# Patient Record
Sex: FEMALE | Race: BLACK OR AFRICAN AMERICAN | Employment: FULL TIME | URBAN - METROPOLITAN AREA
[De-identification: names, ages, dates, MRNs, and addresses within clinical notes are randomized per-mention and may not be internally consistent; named-entity substitution may affect disease eponyms.]

---

## 2017-02-20 ENCOUNTER — HOSPITAL ENCOUNTER (OUTPATIENT)
Dept: LAB | Age: 62
Discharge: HOME | End: 2017-02-20
Payer: COMMERCIAL

## 2017-02-20 LAB
ANION GAP BLD CALC-SCNC: 7 MMOL/L (ref 3–18)
BUN SERPL-MCNC: 12 MG/DL (ref 7–18)
BUN/CREAT SERPL: 12 (ref 12–20)
CALCIUM SERPL-MCNC: 9.5 MG/DL (ref 8.5–10.1)
CHLORIDE SERPL-SCNC: 105 MMOL/L (ref 100–108)
CHOLEST SERPL-MCNC: 158 MG/DL
CO2 SERPL-SCNC: 30 MMOL/L (ref 21–32)
CREAT SERPL-MCNC: 1.03 MG/DL (ref 0.6–1.3)
GLUCOSE SERPL-MCNC: 107 MG/DL (ref 74–99)
HCV AB SER IA-ACNC: 0.08 INDEX
HCV AB SERPL QL IA: NEGATIVE
HCV COMMENT,HCGAC: NORMAL
HDLC SERPL-MCNC: 41 MG/DL (ref 40–60)
HDLC SERPL: 3.9 {RATIO} (ref 0–5)
LDLC SERPL CALC-MCNC: 101.6 MG/DL (ref 0–100)
LIPID PROFILE,FLP: ABNORMAL
POTASSIUM SERPL-SCNC: 3.8 MMOL/L (ref 3.5–5.5)
SODIUM SERPL-SCNC: 142 MMOL/L (ref 136–145)
TRIGL SERPL-MCNC: 77 MG/DL (ref ?–150)
VLDLC SERPL CALC-MCNC: 15.4 MG/DL

## 2017-02-20 PROCEDURE — 86803 HEPATITIS C AB TEST: CPT | Performed by: FAMILY MEDICINE

## 2017-02-20 PROCEDURE — 80061 LIPID PANEL: CPT | Performed by: FAMILY MEDICINE

## 2017-02-20 PROCEDURE — 80048 BASIC METABOLIC PNL TOTAL CA: CPT | Performed by: FAMILY MEDICINE

## 2017-02-20 PROCEDURE — 36415 COLL VENOUS BLD VENIPUNCTURE: CPT | Performed by: FAMILY MEDICINE

## 2017-04-12 ENCOUNTER — OFFICE VISIT (OUTPATIENT)
Dept: FAMILY MEDICINE CLINIC | Age: 62
End: 2017-04-12

## 2017-04-12 VITALS
WEIGHT: 161 LBS | TEMPERATURE: 97.9 F | HEIGHT: 69 IN | HEART RATE: 73 BPM | BODY MASS INDEX: 23.85 KG/M2 | RESPIRATION RATE: 16 BRPM | OXYGEN SATURATION: 98 % | SYSTOLIC BLOOD PRESSURE: 140 MMHG | DIASTOLIC BLOOD PRESSURE: 76 MMHG

## 2017-04-12 DIAGNOSIS — I10 ESSENTIAL HYPERTENSION: Primary | ICD-10-CM

## 2017-04-12 DIAGNOSIS — R73.9 ELEVATED BLOOD SUGAR: ICD-10-CM

## 2017-04-12 DIAGNOSIS — E78.00 HYPERCHOLESTEROLEMIA: ICD-10-CM

## 2017-04-12 RX ORDER — METOPROLOL SUCCINATE 50 MG/1
TABLET, EXTENDED RELEASE ORAL
Qty: 30 TAB | Refills: 6 | Status: SHIPPED | OUTPATIENT
Start: 2017-04-12 | End: 2017-11-06 | Stop reason: SDUPTHER

## 2017-04-12 RX ORDER — HYDROCHLOROTHIAZIDE 25 MG/1
25 TABLET ORAL DAILY
Qty: 30 TAB | Refills: 6 | Status: SHIPPED | OUTPATIENT
Start: 2017-04-12 | End: 2017-11-30 | Stop reason: SDUPTHER

## 2017-04-12 NOTE — PATIENT INSTRUCTIONS

## 2017-04-12 NOTE — MR AVS SNAPSHOT
Visit Information Date & Time Provider Department Dept. Phone Encounter #  
 4/12/2017  3:40 PM Richa Schwab, [address] [id number] Follow-up Instructions Return in about 6 months (around 10/12/2017) for well exam. Upcoming Health Maintenance Date Due DTaP/Tdap/Td series (1 - Tdap) 11/7/1976 BREAST CANCER SCRN MAMMOGRAM 11/7/2005 ZOSTER VACCINE AGE 60> 11/7/2015 INFLUENZA AGE 9 TO ADULT 8/1/2016 PAP AKA CERVICAL CYTOLOGY 2/3/2019 COLONOSCOPY 2/3/2026 Allergies as of 4/12/2017  Review Complete On: 4/12/2017 By: Richa Schwab MD  
  
 Severity Noted Reaction Type Reactions Codeine High 08/16/2010    Nausea and Vomiting Violent vomiting Ace Inhibitors  08/16/2010   Side Effect Cough Clonidine  05/29/2012    Other (comments) Visual disturbance Hyzaar [Losartan-hydrochlorothiazide]  12/08/2011    Other (comments) Palpitations. Able to tolerate HCTZ Current Immunizations  Never Reviewed No immunizations on file. Not reviewed this visit You Were Diagnosed With   
  
 Codes Comments Essential hypertension    -  Primary ICD-10-CM: I10 
ICD-9-CM: 401.9 Hypercholesterolemia     ICD-10-CM: E78.00 ICD-9-CM: 272.0 Elevated blood sugar     ICD-10-CM: R73.9 ICD-9-CM: 790.29 Vitals BP Pulse Temp Resp Height(growth percentile) Weight(growth percentile) 140/76 73 97.9 °F (36.6 °C) (Oral) 16 5' 9\" (1.753 m) 161 lb (73 kg) SpO2 BMI OB Status Smoking Status 98% 23.78 kg/m2 Postmenopausal Never Smoker Vitals History BMI and BSA Data Body Mass Index Body Surface Area 23.78 kg/m 2 1.89 m 2 Preferred Pharmacy [facility] Name [facility] [address], [address] [phone] Your Updated Medication List  
  
   
This list is accurate as of: 4/12/17  4:10 PM.  Always use your most recent med list.  
  
  
  
  
 hydroCHLOROthiazide 25 mg tablet Commonly known as:  HYDRODIURIL Take 1 Tab by mouth daily. JINTELI 1-5 mg-mcg Tab Generic drug:  norethindrone acetate-ethinyl estradiol Take  by mouth. METAMUCIL (SUGAR) Powd Generic drug:  psyllium seed-sucrose Take  by mouth.  
  
 metoprolol succinate 50 mg XL tablet Commonly known as:  TOPROL-XL  
take 1 tablet by mouth once daily MULTI-VITAMIN PO Take 1 Tab by mouth daily. Prescriptions Sent to Pharmacy Refills  
 metoprolol succinate (TOPROL-XL) 50 mg XL tablet 6 Sig: take 1 tablet by mouth once daily Class: Normal  
 Pharmacy: [facility] [address] [place] Ph #: [phone]  
 hydroCHLOROthiazide (HYDRODIURIL) 25 mg tablet 6 Sig: Take 1 Tab by mouth daily. Class: Normal  
 [facility] [address] [place] [address], [facility] Ph #: [phone] Route: Oral  
  
Follow-up Instructions Return in about 6 months (around 10/12/2017) for well exam. To-Do List   
 08/16/2017 Lab:  HEMOGLOBIN A1C WITH EAG   
  
 08/16/2017 Lab:  LIPID PANEL   
  
 08/16/2017 Lab:  METABOLIC PANEL, BASIC Patient Instructions Low Sodium Diet (2,000 Milligram): Care Instructions Your Care Instructions Too much sodium causes your body to hold on to extra water. This can raise your blood pressure and force your heart and kidneys to work harder. In very serious cases, this could cause you to be put in the hospital. It might even be life-threatening. By limiting sodium, you will feel better and lower your risk of serious problems. The most common source of sodium is salt. People get most of the salt in their diet from canned, prepared, and packaged foods. Fast food and restaurant meals also are very high in sodium.  Your doctor will probably limit your sodium to less than 2,000 milligrams (mg) a day. This limit counts all the sodium in prepared and packaged foods and any salt you add to your food. Follow-up care is a key part of your treatment and safety. Be sure to make and go to all appointments, and call your doctor if you are having problems. It's also a good idea to know your test results and keep a list of the medicines you take. How can you care for yourself at home? Read food labels · Read labels on cans and food packages. The labels tell you how much sodium is in each serving. Make sure that you look at the serving size. If you eat more than the serving size, you have eaten more sodium. · Food labels also tell you the Percent Daily Value for sodium. Choose products with low Percent Daily Values for sodium. · Be aware that sodium can come in forms other than salt, including monosodium glutamate (MSG), sodium citrate, and sodium bicarbonate (baking soda). MSG is often added to Asian food. When you eat out, you can sometimes ask for food without MSG or added salt. Buy low-sodium foods · Buy foods that are labeled \"unsalted\" (no salt added), \"sodium-free\" (less than 5 mg of sodium per serving), or \"low-sodium\" (less than 140 mg of sodium per serving). Foods labeled \"reduced-sodium\" and \"light sodium\" may still have too much sodium. Be sure to read the label to see how much sodium you are getting. · Buy fresh vegetables, or frozen vegetables without added sauces. Buy low-sodium versions of canned vegetables, soups, and other canned goods. Prepare low-sodium meals · Cut back on the amount of salt you use in cooking. This will help you adjust to the taste. Do not add salt after cooking. One teaspoon of salt has about 2,300 mg of sodium. · Take the salt shaker off the table. · Flavor your food with garlic, lemon juice, onion, vinegar, herbs, and spices.  Do not use soy sauce, lite soy sauce, steak sauce, onion salt, garlic salt, celery salt, mustard, or ketchup on your food. · Use low-sodium salad dressings, sauces, and ketchup. Or make your own salad dressings and sauces without adding salt. · Use less salt (or none) when recipes call for it. You can often use half the salt a recipe calls for without losing flavor. Other foods such as rice, pasta, and grains do not need added salt. · Rinse canned vegetables, and cook them in fresh water. This removes somebut not allof the salt. · Avoid water that is naturally high in sodium or that has been treated with water softeners, which add sodium. Call your local water company to find out the sodium content of your water supply. If you buy bottled water, read the label and choose a sodium-free brand. Avoid high-sodium foods · Avoid eating: ¨ Smoked, cured, salted, and canned meat, fish, and poultry. ¨ Ham, rodriguez, hot dogs, and luncheon meats. ¨ Regular, hard, and processed cheese and regular peanut butter. ¨ Crackers with salted tops, and other salted snack foods such as pretzels, chips, and salted popcorn. ¨ Frozen prepared meals, unless labeled low-sodium. ¨ Canned and dried soups, broths, and bouillon, unless labeled sodium-free or low-sodium. ¨ Canned vegetables, unless labeled sodium-free or low-sodium. ¨ Western Cara fries, pizza, tacos, and other fast foods. ¨ Pickles, olives, ketchup, and other condiments, especially soy sauce, unless labeled sodium-free or low-sodium. Where can you learn more? Go to http://vidya-arcadio.info/. Enter T952 in the search box to learn more about \"Low Sodium Diet (2,000 Milligram): Care Instructions. \" Current as of: July 26, 2016 Content Version: 11.2 © 3400-4538 [employer]. Care instructions adapted under license by [employer] (which disclaims liability or warranty for this information).  If you have questions about a medical condition or this instruction, always ask your healthcare professional. [address]  any warranty or liability for your use of this information. Introducing [facility] [place] [facility]! Dear Tea Goyal: Thank you for requesting a [employer] account. Our records indicate that you already have an active [employer]ine[employer] account. You can access your account anytime at [email or web address] Did you know that you can access your hospital and ER discharge instructions at any time in Coiney? You can also review all of your test results from your hospital stay or ER visit. Additional Information If you have questions, please visit the Frequently Asked Questions section of the [employer] website at [email or web address] Remember, Coiney is NOT to be used for urgent needs. For medical emergencies, dial 911. Now available from your iPhone and Android! Please provide this summary of care documentation to your next provider. Your primary care clinician is listed as [address]. If you have any questions after today's visit, please call [phone].

## 2017-04-12 NOTE — PROGRESS NOTES
1. Have you been to the ER, urgent care clinic since your last visit? Hospitalized since your last visit? No    2. Have you seen or consulted any other health care providers outside of the [address] [place] [address] since your last visit? Include any pap smears or colon screening.  No

## 2017-04-12 NOTE — PROGRESS NOTES
HISTORY OF PRESENT ILLNESS  Tiffany Ramirez is a 64 y.o. female. HPI  Patient is here today for evaluation and treatment of: Hypertension/Cholesterol problem    Hypertension: initial BP here is elevated. Repeat is better. She at times may have elevated readings at home but most of the BPs are stable. Cholesterol: she is not on meds for chol ; she attempts a lower cholesterol diet ; She stays active. Blood sugar was noted to be slightly elevated at last blood check; Will check an A1c at the next check. Current Outpatient Prescriptions:     metoprolol succinate (TOPROL-XL) 50 mg XL tablet, take 1 tablet by mouth once daily, Disp: 30 Tab, Rfl: 6    hydroCHLOROthiazide (HYDRODIURIL) 25 mg tablet, Take 1 Tab by mouth daily. , Disp: 30 Tab, Rfl: 6    ethinyl estradiol-norethindron (JINTELI) 1-5 mg-mcg Tab, Take  by mouth., Disp: , Rfl:     psyllium seed-sucrose (METAMUCIL) Powd, Take  by mouth., Disp: , Rfl:     MULTI-VITAMIN PO, Take 1 Tab by mouth daily. , Disp: , Rfl:       PMH,  Meds, Allergies, Family History, Social history reviewed    Review of Systems   Respiratory: Negative for sputum production. Cardiovascular: Negative for chest pain. Physical Exam   Constitutional: She appears well-developed and well-nourished. No distress. Neck: Neck supple. No thyromegaly present. No carotid bruits   Cardiovascular: Normal rate and regular rhythm. Exam reveals no gallop and no friction rub. No murmur heard. Pulmonary/Chest: Breath sounds normal. No respiratory distress. She has no wheezes. She has no rales. Musculoskeletal: She exhibits no edema. Nursing note and vitals reviewed.      Visit Vitals    /76    Pulse 73    Temp 97.9 °F (36.6 °C) (Oral)    Resp 16    Ht 5' 9\" (1.753 m)    Wt 161 lb (73 kg)    SpO2 98%    BMI 23.78 kg/m2           Lab Results   Component Value Date/Time    Cholesterol, total 158 02/20/2017 08:25 AM    HDL Cholesterol 41 02/20/2017 08:25 AM    LDL, calculated 101.6 02/20/2017 08:25 AM    VLDL, calculated 15.4 02/20/2017 08:25 AM    Triglyceride 77 02/20/2017 08:25 AM    CHOL/HDL Ratio 3.9 02/20/2017 08:25 AM     Lab Results   Component Value Date/Time    Sodium 142 02/20/2017 08:25 AM    Potassium 3.8 02/20/2017 08:25 AM    Chloride 105 02/20/2017 08:25 AM    CO2 30 02/20/2017 08:25 AM    Anion gap 7 02/20/2017 08:25 AM    Glucose 107 02/20/2017 08:25 AM    BUN 12 02/20/2017 08:25 AM    Creatinine 1.03 02/20/2017 08:25 AM    BUN/Creatinine ratio 12 02/20/2017 08:25 AM    GFR est AA >60 02/20/2017 08:25 AM    GFR est non-AA 54 02/20/2017 08:25 AM    Calcium 9.5 02/20/2017 08:25 AM       ASSESSMENT and PLAN    ICD-10-CM ICD-9-CM    1. Essential hypertension F35 314.8 METABOLIC PANEL, BASIC   2. Hypercholesterolemia E78.00 272.0 LIPID PANEL   3. Elevated blood sugar R73.9 790.29 HEMOGLOBIN A1C WITH EAG       As above,   above all stable unless otherwise noted  Labs as ordered  Continue current meds as ordered  Lower salt, lower carb diet advised  Follow-up Disposition:  Return in about 6 months (around 10/12/2017) for well exam.  An After Visit Summary was printed and given to the patient. This has been fully explained to the patient, who indicates understanding.

## 2017-08-16 DIAGNOSIS — I10 ESSENTIAL HYPERTENSION: ICD-10-CM

## 2017-08-16 DIAGNOSIS — E78.00 HYPERCHOLESTEROLEMIA: ICD-10-CM

## 2017-08-16 DIAGNOSIS — R73.9 ELEVATED BLOOD SUGAR: ICD-10-CM

## 2017-11-07 ENCOUNTER — HOSPITAL ENCOUNTER (OUTPATIENT)
Dept: LAB | Age: 62
Discharge: HOME | End: 2017-11-07
Payer: COMMERCIAL

## 2017-11-07 LAB
ANION GAP SERPL CALC-SCNC: 6 MMOL/L (ref 3–18)
BUN SERPL-MCNC: 14 MG/DL (ref 7–18)
BUN/CREAT SERPL: 15 (ref 12–20)
CALCIUM SERPL-MCNC: 9.2 MG/DL (ref 8.5–10.1)
CHLORIDE SERPL-SCNC: 104 MMOL/L (ref 100–108)
CHOLEST SERPL-MCNC: 148 MG/DL
CO2 SERPL-SCNC: 30 MMOL/L (ref 21–32)
CREAT SERPL-MCNC: 0.96 MG/DL (ref 0.6–1.3)
EST. AVERAGE GLUCOSE BLD GHB EST-MCNC: 126 MG/DL
GLUCOSE SERPL-MCNC: 99 MG/DL (ref 74–99)
HBA1C MFR BLD: 6 % (ref 4.2–5.6)
HDLC SERPL-MCNC: 45 MG/DL (ref 40–60)
HDLC SERPL: 3.3 {RATIO} (ref 0–5)
LDLC SERPL CALC-MCNC: 90.4 MG/DL (ref 0–100)
LIPID PROFILE,FLP: NORMAL
POTASSIUM SERPL-SCNC: 3.7 MMOL/L (ref 3.5–5.5)
SODIUM SERPL-SCNC: 140 MMOL/L (ref 136–145)
TRIGL SERPL-MCNC: 63 MG/DL (ref ?–150)
VLDLC SERPL CALC-MCNC: 12.6 MG/DL

## 2017-11-07 PROCEDURE — 83036 HEMOGLOBIN GLYCOSYLATED A1C: CPT | Performed by: FAMILY MEDICINE

## 2017-11-07 PROCEDURE — 80048 BASIC METABOLIC PNL TOTAL CA: CPT | Performed by: FAMILY MEDICINE

## 2017-11-07 PROCEDURE — 80061 LIPID PANEL: CPT | Performed by: FAMILY MEDICINE

## 2017-11-07 PROCEDURE — 36415 COLL VENOUS BLD VENIPUNCTURE: CPT | Performed by: FAMILY MEDICINE

## 2017-11-07 RX ORDER — METOPROLOL SUCCINATE 50 MG/1
TABLET, EXTENDED RELEASE ORAL
Qty: 30 TAB | Refills: 6 | Status: SHIPPED | OUTPATIENT
Start: 2017-11-07 | End: 2017-11-15 | Stop reason: SDUPTHER

## 2017-11-15 ENCOUNTER — OFFICE VISIT (OUTPATIENT)
Dept: FAMILY MEDICINE CLINIC | Age: 62
End: 2017-11-15

## 2017-11-15 ENCOUNTER — HOSPITAL ENCOUNTER (OUTPATIENT)
Dept: LAB | Age: 62
Discharge: HOME | End: 2017-11-15
Payer: COMMERCIAL

## 2017-11-15 VITALS
DIASTOLIC BLOOD PRESSURE: 80 MMHG | WEIGHT: 165 LBS | HEART RATE: 76 BPM | BODY MASS INDEX: 24.44 KG/M2 | TEMPERATURE: 99.3 F | OXYGEN SATURATION: 98 % | RESPIRATION RATE: 18 BRPM | SYSTOLIC BLOOD PRESSURE: 150 MMHG | HEIGHT: 69 IN

## 2017-11-15 DIAGNOSIS — D50.8 IRON DEFICIENCY ANEMIA SECONDARY TO INADEQUATE DIETARY IRON INTAKE: ICD-10-CM

## 2017-11-15 DIAGNOSIS — Z00.00 WELL WOMAN EXAM (NO GYNECOLOGICAL EXAM): Primary | ICD-10-CM

## 2017-11-15 LAB
BASOPHILS # BLD: 0 K/UL (ref 0–0.06)
BASOPHILS NFR BLD: 0 % (ref 0–2)
DIFFERENTIAL METHOD BLD: ABNORMAL
EOSINOPHIL # BLD: 0.2 K/UL (ref 0–0.4)
EOSINOPHIL NFR BLD: 2 % (ref 0–5)
ERYTHROCYTE [DISTWIDTH] IN BLOOD BY AUTOMATED COUNT: 13 % (ref 11.6–14.5)
HCT VFR BLD AUTO: 37.8 % (ref 35–45)
HGB BLD-MCNC: 12.7 G/DL (ref 12–16)
LYMPHOCYTES # BLD: 3.1 K/UL (ref 0.9–3.6)
LYMPHOCYTES NFR BLD: 38 % (ref 21–52)
MCH RBC QN AUTO: 31.4 PG (ref 24–34)
MCHC RBC AUTO-ENTMCNC: 33.6 G/DL (ref 31–37)
MCV RBC AUTO: 93.6 FL (ref 74–97)
MONOCYTES # BLD: 0.4 K/UL (ref 0.05–1.2)
MONOCYTES NFR BLD: 5 % (ref 3–10)
NEUTS SEG # BLD: 4.4 K/UL (ref 1.8–8)
NEUTS SEG NFR BLD: 55 % (ref 40–73)
PLATELET # BLD AUTO: 202 K/UL (ref 135–420)
PMV BLD AUTO: 12.6 FL (ref 9.2–11.8)
RBC # BLD AUTO: 4.04 M/UL (ref 4.2–5.3)
WBC # BLD AUTO: 8.1 K/UL (ref 4.6–13.2)

## 2017-11-15 PROCEDURE — 36415 COLL VENOUS BLD VENIPUNCTURE: CPT | Performed by: FAMILY MEDICINE

## 2017-11-15 PROCEDURE — 85025 COMPLETE CBC W/AUTO DIFF WBC: CPT | Performed by: FAMILY MEDICINE

## 2017-11-15 RX ORDER — METOPROLOL SUCCINATE 100 MG/1
TABLET, EXTENDED RELEASE ORAL
Qty: 30 TAB | Refills: 6 | Status: SHIPPED | OUTPATIENT
Start: 2017-11-15 | End: 2018-06-04 | Stop reason: SDUPTHER

## 2017-11-15 NOTE — PROGRESS NOTES
1. Have you been to the ER, urgent care clinic since your last visit? Hospitalized since your last visit? No    2. Have you seen or consulted any other health care providers outside of the [address] [place] [address] since your last visit? Include any pap smears or colon screening.  No

## 2017-11-15 NOTE — PROGRESS NOTES
HISTORY OF PRESENT ILLNESS  Glendora Everett Brunner is a 58 y.o. female. HPI   Patient is here today for evaluation and treatment of; Hypertension.       Hypertension:        ROS    Physical Exam    ASSESSMENT and PLAN  Duplicate info

## 2017-11-15 NOTE — MR AVS SNAPSHOT
Visit Information Date & Time Provider Department Dept. Phone Encounter #  
 11/15/2017  3:20 PM Fadumo Leo, [address] [place] [address] [place] [facility] [phone] [id number] Follow-up Instructions Return in about 3 months (around 2/15/2018) for BP/toprol increase/prediabetes. Upcoming Health Maintenance Date Due ZOSTER VACCINE AGE 60> 1/25/2018* PAP AKA CERVICAL CYTOLOGY 2/3/2019 BREAST CANCER SCRN MAMMOGRAM 2/15/2019 COLONOSCOPY 2/3/2026 DTaP/Tdap/Td series (2 - Td) 11/15/2027 *Topic was postponed. The date shown is not the original due date. Allergies as of 11/15/2017  Review Complete On: 11/15/2017 By: Fadumo Leo MD  
  
 Severity Noted Reaction Type Reactions Codeine High 08/16/2010    Nausea and Vomiting Violent vomiting Ace Inhibitors  08/16/2010   Side Effect Cough Clonidine  05/29/2012    Other (comments) Visual disturbance Hyzaar [Losartan-hydrochlorothiazide]  12/08/2011    Other (comments) Palpitations. Able to tolerate HCTZ Current Immunizations  Never Reviewed Name Date Influenza Vaccine 9/1/2017 Not reviewed this visit You Were Diagnosed With   
  
 Codes Comments Well woman exam (no gynecological exam)    -  Primary ICD-10-CM: Z00.00 ICD-9-CM: V70.0 Iron deficiency anemia secondary to inadequate dietary iron intake     ICD-10-CM: D50.8 ICD-9-CM: 280.1 Vitals BP Pulse Temp Resp Height(growth percentile) Weight(growth percentile) 150/80 76 99.3 °F (37.4 °C) (Oral) 18 5' 9\" (1.753 m) 165 lb (74.8 kg) SpO2 BMI OB Status Smoking Status 98% 24.37 kg/m2 Postmenopausal Never Smoker Vitals History BMI and BSA Data Body Mass Index Body Surface Area  
 24.37 kg/m 2 1.91 m 2 Preferred Pharmacy Pharmacy Name Phone [facility] [address] [facility], [address] [facility] [facility] [phone] Your Updated Medication List  
  
   
 This list is accurate as of: 11/15/17  4:26 PM.  Always use your most recent med list.  
  
  
  
  
 FLUARIX QUAD 1129-7433 (PF) Syrg injection Generic drug:  influenza vaccine 2017-18 (3 yrs+)(PF)  
inject 0.5 milliliter intramuscularly  
  
 hydroCHLOROthiazide 25 mg tablet Commonly known as:  HYDRODIURIL Take 1 Tab by mouth daily. JINTELI 1-5 mg-mcg Tab Generic drug:  norethindrone acetate-ethinyl estradiol Take  by mouth. METAMUCIL (SUGAR) Powd Generic drug:  psyllium seed-sucrose Take  by mouth.  
  
 metoprolol succinate 100 mg tablet Commonly known as:  TOPROL-XL  
take 1 tablet by mouth once daily [address] Take  by mouth. MULTI-VITAMIN PO Take 1 Tab by mouth daily. Prescriptions Sent to Pharmacy Refills  
 metoprolol succinate (TOPROL-XL) 100 mg tablet 6 Sig: take 1 tablet by mouth once daily Class: Normal  
 Pharmacy: [facility] [address] [place] [address], [address] [place] [address] #: [phone] Follow-up Instructions Return in about 3 months (around 2/15/2018) for BP/toprol increase/prediabetes. To-Do List   
 11/15/2017 Lab:  CBC WITH AUTOMATED DIFF Patient Instructions Well Visit, Women 48 to 72: Care Instructions Your Care Instructions Physical exams can help you stay healthy. Your doctor has checked your overall health and may have suggested ways to take good care of yourself. He or she also may have recommended tests. At home, you can help prevent illness with healthy eating, regular exercise, and other steps. Follow-up care is a key part of your treatment and safety. Be sure to make and go to all appointments, and call your doctor if you are having problems. It's also a good idea to know your test results and keep a list of the medicines you take. How can you care for yourself at home? · Reach and stay at a healthy weight.  This will lower your risk for many problems, such as obesity, diabetes, heart disease, and high blood pressure. · Get at least 30 minutes of exercise on most days of the week. Walking is a good choice. You also may want to do other activities, such as running, swimming, cycling, or playing tennis or team sports. · Do not smoke. Smoking can make health problems worse. If you need help quitting, talk to your doctor about stop-smoking programs and medicines. These can increase your chances of quitting for good. · Protect your skin from too much sun. When you're outdoors from 10 a.m. to 4 p.m., stay in the shade or cover up with clothing and a hat with a wide brim. Wear sunglasses that block UV rays. Even when it's cloudy, put broad-spectrum sunscreen (SPF 30 or higher) on any exposed skin. · See a dentist one or two times a year for checkups and to have your teeth cleaned. · Wear a seat belt in the car. · Limit alcohol to 1 drink a day. Too much alcohol can cause health problems. Follow your doctor's advice about when to have certain tests. These tests can spot problems early. · Cholesterol. Your doctor will tell you how often to have this done based on your age, family history, or other things that can increase your risk for heart attack and stroke. · Blood pressure. Have your blood pressure checked during a routine doctor visit. Your doctor will tell you how often to check your blood pressure based on your age, your blood pressure results, and other factors. · Mammogram. Ask your doctor how often you should have a mammogram, which is an X-ray of your breasts. A mammogram can spot breast cancer before it can be felt and when it is easiest to treat. · Pap test and pelvic exam. Ask your doctor how often you should have a Pap test. You may not need to have a Pap test as often as you used to. · Vision. Have your eyes checked every year or two or as often as your doctor suggests.  Some experts recommend that you have yearly exams for glaucoma and other age-related eye problems starting at age 48. · Hearing. Tell your doctor if you notice any change in your hearing. You can have tests to find out how well you hear. · Diabetes. Ask your doctor whether you should have tests for diabetes. · Colon cancer. You should begin tests for colon cancer at age 48. You may have one of several tests. Your doctor will tell you how often to have tests based on your age and risk. Risks include whether you already had a precancerous polyp removed from your colon or whether your parents, sisters and brothers, or children have had colon cancer. · Thyroid disease. Talk to your doctor about whether to have your thyroid checked as part of a regular physical exam. Women have an increased chance of a thyroid problem. · Osteoporosis. You should begin tests for bone density at age 72. If you are younger than 72, ask your doctor whether you have factors that may increase your risk for this disease. You may want to have this test before age 72. · Heart attack and stroke risk. At least every 4 to 6 years, you should have your risk for heart attack and stroke assessed. Your doctor uses factors such as your age, blood pressure, cholesterol, and whether you smoke or have diabetes to show what your risk for a heart attack or stroke is over the next 10 years. When should you call for help? Watch closely for changes in your health, and be sure to contact your doctor if you have any problems or symptoms that concern you. Where can you learn more? Go to http://vidya-arcadio.info/. Enter A692 in the search box to learn more about \"Well Visit, Women 50 to 72: Care Instructions. \" Current as of: May 12, 2017 Content Version: 11.4 © 6519-3866 [employer]. Care instructions adapted under license by [employer] (which disclaims liability or warranty for this information).  If you have questions about a medical condition or this instruction, always ask your healthcare professional. [place] [address] any warranty or liability for your use of this information. Learning About Diabetes Food Guidelines Your Care Instructions Meal planning is important to manage diabetes. It helps keep your blood sugar at a target level (which you set with your doctor). You don't have to eat special foods. You can eat what your family eats, including sweets once in a while. But you do have to pay attention to how often you eat and how much you eat of certain foods. You may want to work with a dietitian or a certified diabetes educator (CDE) to help you plan meals and snacks. A dietitian or CDE can also help you lose weight if that is one of your goals. What should you know about eating carbs? Managing the amount of carbohydrate (carbs) you eat is an important part of healthy meals when you have diabetes. Carbohydrate is found in many foods. · Learn which foods have carbs. And learn the amounts of carbs in different foods. ¨ Bread, cereal, pasta, and rice have about 15 grams of carbs in a serving. A serving is 1 slice of bread (1 ounce), ½ cup of cooked cereal, or 1/3 cup of cooked pasta or rice. ¨ Fruits have 15 grams of carbs in a serving. A serving is 1 small fresh fruit, such as an apple or orange; ½ of a banana; ½ cup of cooked or canned fruit; ½ cup of fruit juice; 1 cup of melon or raspberries; or 2 tablespoons of dried fruit. ¨ Milk and no-sugar-added yogurt have 15 grams of carbs in a serving. A serving is 1 cup of milk or 2/3 cup of no-sugar-added yogurt. ¨ Starchy vegetables have 15 grams of carbs in a serving. A serving is ½ cup of mashed potatoes or sweet potato; 1 cup winter squash; ½ of a small baked potato; ½ cup of cooked beans; or ½ cup cooked corn or green peas. · Learn how much carbs to eat each day and at each meal. A dietitian or CDE can teach you how to keep track of the amount of carbs you eat.  This is called carbohydrate counting. · If you are not sure how to count carbohydrate grams, use the Plate Method to plan meals. It is a good, quick way to make sure that you have a balanced meal. It also helps you spread carbs throughout the day. ¨ Divide your plate by types of foods. Put non-starchy vegetables on half the plate, meat or other protein food on one-quarter of the plate, and a grain or starchy vegetable in the final quarter of the plate. To this you can add a small piece of fruit and 1 cup of milk or yogurt, depending on how many carbs you are supposed to eat at a meal. 
· Try to eat about the same amount of carbs at each meal. Do not \"save up\" your daily allowance of carbs to eat at one meal. 
· Proteins have very little or no carbs per serving. Examples of proteins are beef, chicken, turkey, fish, eggs, tofu, cheese, cottage cheese, and peanut butter. A serving size of meat is 3 ounces, which is about the size of a deck of cards. Examples of meat substitute serving sizes (equal to 1 ounce of meat) are 1/4 cup of cottage cheese, 1 egg, 1 tablespoon of peanut butter, and ½ cup of tofu. How can you eat out and still eat healthy? · Learn to estimate the serving sizes of foods that have carbohydrate. If you measure food at home, it will be easier to estimate the amount in a serving of restaurant food. · If the meal you order has too much carbohydrate (such as potatoes, corn, or baked beans), ask to have a low-carbohydrate food instead. Ask for a salad or green vegetables. · If you use insulin, check your blood sugar before and after eating out to help you plan how much to eat in the future. · If you eat more carbohydrate at a meal than you had planned, take a walk or do other exercise. This will help lower your blood sugar. What else should you know? · Limit saturated fat, such as the fat from meat and dairy products.  This is a healthy choice because people who have diabetes are at higher risk of heart disease. So choose lean cuts of meat and nonfat or low-fat dairy products. Use olive or canola oil instead of butter or shortening when cooking. · Don't skip meals. Your blood sugar may drop too low if you skip meals and take insulin or certain medicines for diabetes. · Check with your doctor before you drink alcohol. Alcohol can cause your blood sugar to drop too low. Alcohol can also cause a bad reaction if you take certain diabetes medicines. Follow-up care is a key part of your treatment and safety. Be sure to make and go to all appointments, and call your doctor if you are having problems. It's also a good idea to know your test results and keep a list of the medicines you take. Where can you learn more? Go to http://vidya-arcadio.info/. Enter C775 in the search box to learn more about \"Learning About Diabetes Food Guidelines. \" Current as of: March 13, 2017 Content Version: 11.4 © 5547-5434 [employer]. Care instructions adapted under license by [employer] (which disclaims liability or warranty for this information). If you have questions about a medical condition or this instruction, always ask your healthcare professional. [address] any warranty or liability for your use of this information. Introducing \[facility]\"" & HEALTH SERVICES! Dear Nathaly Nazario: Thank you for requesting a [employer]Hub account. Our records indicate that you already have an active Aw[employer]Hub account. You can access your account anytime at [email or web address] Did you know that you can access your hospital and ER discharge instructions at any time in Aw[employer]Hub? You can also review all of your test results from your hospital stay or ER visit. Additional Information If you have questions, please visit the Frequently Asked Questions section of the Aw[employer]Hub website at [email or web address] Remember, MyChart is NOT to be used for urgent needs. For medical emergencies, dial 911. Now available from your iPhone and Android! Please provide this summary of care documentation to your next provider. Your primary care clinician is listed as [address]. If you have any questions after today's visit, please call [phone].

## 2017-11-15 NOTE — PROGRESS NOTES
Subjective:   58 y.o. female for Well Woman Check. No LMP recorded. Patient is postmenopausal.     Doing well; has a h/o anemia needs a follow up CBC'  BP is elevated today    Social History: single partner, contraception - OCP (Oral Contraceptive Pills). Pertinent past medical hstory: as below. Patient Active Problem List    Diagnosis Date Noted    Hot flashes     Prediabetes     De Quervain's tenosynovitis, left 12/22/2015    HTN (hypertension) 08/16/2010    Hypercholesterolemia 08/16/2010     Current Outpatient Prescriptions   Medication Sig Dispense Refill    GLUCOSAM/CHOND/HYALU/CF BORATE (MOVE FREE JOINT HEALTH PO) Take  by mouth.  metoprolol succinate (TOPROL-XL) 100 mg tablet take 1 tablet by mouth once daily 30 Tab 6    hydroCHLOROthiazide (HYDRODIURIL) 25 mg tablet Take 1 Tab by mouth daily. 30 Tab 6    ethinyl estradiol-norethindron (JINTELI) 1-5 mg-mcg Tab Take  by mouth.  psyllium seed-sucrose (METAMUCIL) Powd Take  by mouth.  MULTI-VITAMIN PO Take 1 Tab by mouth daily.  FLUARIX QUAD 4802-0084, PF, syrg injection inject 0.5 milliliter intramuscularly  0     Allergies   Allergen Reactions    Codeine Nausea and Vomiting     Violent vomiting    Ace Inhibitors Cough    Clonidine Other (comments)     Visual disturbance    Hyzaar [Losartan-Hydrochlorothiazide] Other (comments)     Palpitations.  Able to tolerate HCTZ     Past Medical History:   Diagnosis Date    Anemia NEC     Arthritis     Carpal tunnel syndrome     without loss of sensation in the right hand    Hot flashes     Hypercholesterolemia     Hypercholesterolemia 8/16/2010    Hypertension     Numbness and tingling     fingers    Prediabetes     Right wrist pain     Stenosing tenosynovitis of wrist     first dorsal compartment, right wrist     Past Surgical History:   Procedure Laterality Date    HX GYN      Fibroid Emobliation 2001    HX GYN      BTL 1979     Family History   Problem Relation Age of Onset    Hypertension Mother     Heart Disease Mother     Hypertension Father     Hypertension Brother     Arthritis-osteo Other      Social History   Substance Use Topics    Smoking status: Never Smoker    Smokeless tobacco: Never Used    Alcohol use No        ROS:  Feeling well. No dyspnea or chest pain on exertion. No abdominal pain, change in bowel habits, black or bloody stools. No urinary tract symptoms. GYN ROS: no breast pain or new or enlarging lumps on self exam. No neurological complaints. Objective:     Visit Vitals    /80    Pulse 76    Temp 99.3 °F (37.4 °C) (Oral)    Resp 18    Ht 5' 9\" (1.753 m)    Wt 165 lb (74.8 kg)    SpO2 98%    BMI 24.37 kg/m2     The patient appears well, alert, oriented x 3, in no distress. ENT normal.  Neck supple. No adenopathy or thyromegaly. SHERI. Lungs are clear, good air entry, no wheezes, rhonchi or rales. S1 and S2 normal, no murmurs, regular rate and rhythm. Abdomen soft without tenderness, guarding, mass or organomegaly. Extremities show no edema, normal peripheral pulses. Neurological is normal, no focal findings. Assessment/Plan:   well woman  additional lab tests per orders  return annually or prn    ICD-10-CM ICD-9-CM    1. Well woman exam (no gynecological exam) Z00.00 V70.0    2. Iron deficiency anemia secondary to inadequate dietary iron intake D50.8 280.1 CBC WITH AUTOMATED DIFF   . As above,  above all stable unless otherwise noted  Labs as ordered  Continue current meds as ordered  Orders Placed This Encounter    CBC WITH AUTOMATED DIFF    GLUCOSAM/CHOND/HYALU/CF BORATE ([address])    FLUARIX QUAD 9620-8861, PF, syrg injection    metoprolol succinate (TOPROL-XL) 100 mg tablet     Increase toprol to 100 daily  Follow-up Disposition:  Return in about 3 months (around 2/15/2018) for BP/toprol increase/prediabetes. An After Visit Summary was printed and given to the patient.   This has been fully explained to the patient, who indicates understanding.

## 2017-11-15 NOTE — PATIENT INSTRUCTIONS
Well Visit, Women 48 to 72: Care Instructions  Your Care Instructions    Physical exams can help you stay healthy. Your doctor has checked your overall health and may have suggested ways to take good care of yourself. He or she also may have recommended tests. At home, you can help prevent illness with healthy eating, regular exercise, and other steps. Follow-up care is a key part of your treatment and safety. Be sure to make and go to all appointments, and call your doctor if you are having problems. It's also a good idea to know your test results and keep a list of the medicines you take. How can you care for yourself at home? · Reach and stay at a healthy weight. This will lower your risk for many problems, such as obesity, diabetes, heart disease, and high blood pressure. · Get at least 30 minutes of exercise on most days of the week. Walking is a good choice. You also may want to do other activities, such as running, swimming, cycling, or playing tennis or team sports. · Do not smoke. Smoking can make health problems worse. If you need help quitting, talk to your doctor about stop-smoking programs and medicines. These can increase your chances of quitting for good. · Protect your skin from too much sun. When you're outdoors from 10 a.m. to 4 p.m., stay in the shade or cover up with clothing and a hat with a wide brim. Wear sunglasses that block UV rays. Even when it's cloudy, put broad-spectrum sunscreen (SPF 30 or higher) on any exposed skin. · See a dentist one or two times a year for checkups and to have your teeth cleaned. · Wear a seat belt in the car. · Limit alcohol to 1 drink a day. Too much alcohol can cause health problems. Follow your doctor's advice about when to have certain tests. These tests can spot problems early. · Cholesterol.  Your doctor will tell you how often to have this done based on your age, family history, or other things that can increase your risk for heart attack and stroke. · Blood pressure. Have your blood pressure checked during a routine doctor visit. Your doctor will tell you how often to check your blood pressure based on your age, your blood pressure results, and other factors. · Mammogram. Ask your doctor how often you should have a mammogram, which is an X-ray of your breasts. A mammogram can spot breast cancer before it can be felt and when it is easiest to treat. · Pap test and pelvic exam. Ask your doctor how often you should have a Pap test. You may not need to have a Pap test as often as you used to. · Vision. Have your eyes checked every year or two or as often as your doctor suggests. Some experts recommend that you have yearly exams for glaucoma and other age-related eye problems starting at age 48. · Hearing. Tell your doctor if you notice any change in your hearing. You can have tests to find out how well you hear. · Diabetes. Ask your doctor whether you should have tests for diabetes. · Colon cancer. You should begin tests for colon cancer at age 48. You may have one of several tests. Your doctor will tell you how often to have tests based on your age and risk. Risks include whether you already had a precancerous polyp removed from your colon or whether your parents, sisters and brothers, or children have had colon cancer. · Thyroid disease. Talk to your doctor about whether to have your thyroid checked as part of a regular physical exam. Women have an increased chance of a thyroid problem. · Osteoporosis. You should begin tests for bone density at age 72. If you are younger than 72, ask your doctor whether you have factors that may increase your risk for this disease. You may want to have this test before age 72. · Heart attack and stroke risk. At least every 4 to 6 years, you should have your risk for heart attack and stroke assessed.  Your doctor uses factors such as your age, blood pressure, cholesterol, and whether you smoke or have diabetes to show what your risk for a heart attack or stroke is over the next 10 years. When should you call for help? Watch closely for changes in your health, and be sure to contact your doctor if you have any problems or symptoms that concern you. Where can you learn more? Go to http://vidya-arcadio.info/. Enter H418 in the search box to learn more about \"Well Visit, Women 50 to 72: Care Instructions. \"  Current as of: May 12, 2017  Content Version: 11.4  © 1917-5120 [employer]. Care instructions adapted under license by [employer] (which disclaims liability or warranty for this information). If you have questions about a medical condition or this instruction, always ask your healthcare professional. [address] any warranty or liability for your use of this information. Learning About Diabetes Food Guidelines  Your Care Instructions    Meal planning is important to manage diabetes. It helps keep your blood sugar at a target level (which you set with your doctor). You don't have to eat special foods. You can eat what your family eats, including sweets once in a while. But you do have to pay attention to how often you eat and how much you eat of certain foods. You may want to work with a dietitian or a certified diabetes educator (CDE) to help you plan meals and snacks. A dietitian or CDE can also help you lose weight if that is one of your goals. What should you know about eating carbs? Managing the amount of carbohydrate (carbs) you eat is an important part of healthy meals when you have diabetes. Carbohydrate is found in many foods. · Learn which foods have carbs. And learn the amounts of carbs in different foods. ¨ Bread, cereal, pasta, and rice have about 15 grams of carbs in a serving. A serving is 1 slice of bread (1 ounce), ½ cup of cooked cereal, or 1/3 cup of cooked pasta or rice. ¨ Fruits have 15 grams of carbs in a serving.  A serving is 1 small fresh fruit, such as an apple or orange; ½ of a banana; ½ cup of cooked or canned fruit; ½ cup of fruit juice; 1 cup of melon or raspberries; or 2 tablespoons of dried fruit. ¨ Milk and no-sugar-added yogurt have 15 grams of carbs in a serving. A serving is 1 cup of milk or 2/3 cup of no-sugar-added yogurt. ¨ Starchy vegetables have 15 grams of carbs in a serving. A serving is ½ cup of mashed potatoes or sweet potato; 1 cup winter squash; ½ of a small baked potato; ½ cup of cooked beans; or ½ cup cooked corn or green peas. · Learn how much carbs to eat each day and at each meal. A dietitian or CDE can teach you how to keep track of the amount of carbs you eat. This is called carbohydrate counting. · If you are not sure how to count carbohydrate grams, use the Plate Method to plan meals. It is a good, quick way to make sure that you have a balanced meal. It also helps you spread carbs throughout the day. ¨ Divide your plate by types of foods. Put non-starchy vegetables on half the plate, meat or other protein food on one-quarter of the plate, and a grain or starchy vegetable in the final quarter of the plate. To this you can add a small piece of fruit and 1 cup of milk or yogurt, depending on how many carbs you are supposed to eat at a meal.  · Try to eat about the same amount of carbs at each meal. Do not \"save up\" your daily allowance of carbs to eat at one meal.  · Proteins have very little or no carbs per serving. Examples of proteins are beef, chicken, turkey, fish, eggs, tofu, cheese, cottage cheese, and peanut butter. A serving size of meat is 3 ounces, which is about the size of a deck of cards. Examples of meat substitute serving sizes (equal to 1 ounce of meat) are 1/4 cup of cottage cheese, 1 egg, 1 tablespoon of peanut butter, and ½ cup of tofu. How can you eat out and still eat healthy? · Learn to estimate the serving sizes of foods that have carbohydrate.  If you measure food at home, it will be easier to estimate the amount in a serving of restaurant food. · If the meal you order has too much carbohydrate (such as potatoes, corn, or baked beans), ask to have a low-carbohydrate food instead. Ask for a salad or green vegetables. · If you use insulin, check your blood sugar before and after eating out to help you plan how much to eat in the future. · If you eat more carbohydrate at a meal than you had planned, take a walk or do other exercise. This will help lower your blood sugar. What else should you know? · Limit saturated fat, such as the fat from meat and dairy products. This is a healthy choice because people who have diabetes are at higher risk of heart disease. So choose lean cuts of meat and nonfat or low-fat dairy products. Use olive or canola oil instead of butter or shortening when cooking. · Don't skip meals. Your blood sugar may drop too low if you skip meals and take insulin or certain medicines for diabetes. · Check with your doctor before you drink alcohol. Alcohol can cause your blood sugar to drop too low. Alcohol can also cause a bad reaction if you take certain diabetes medicines. Follow-up care is a key part of your treatment and safety. Be sure to make and go to all appointments, and call your doctor if you are having problems. It's also a good idea to know your test results and keep a list of the medicines you take. Where can you learn more? Go to http://vidya-arcadio.info/. Enter Z316 in the search box to learn more about \"Learning About Diabetes Food Guidelines. \"  Current as of: March 13, 2017  Content Version: 11.4  © 6996-9464 [employer]. Care instructions adapted under license by [employer] (which disclaims liability or warranty for this information).  If you have questions about a medical condition or this instruction, always ask your healthcare professional. Maryann Bowling disclaims any warranty or liability for your use of this information.

## 2017-12-05 RX ORDER — HYDROCHLOROTHIAZIDE 25 MG/1
TABLET ORAL
Qty: 30 TAB | Refills: 3 | Status: SHIPPED | OUTPATIENT
Start: 2017-12-05 | End: 2018-02-14 | Stop reason: SDUPTHER

## 2018-02-14 ENCOUNTER — OFFICE VISIT (OUTPATIENT)
Dept: FAMILY MEDICINE CLINIC | Age: 63
End: 2018-02-14

## 2018-02-14 VITALS
TEMPERATURE: 98.4 F | HEIGHT: 69 IN | WEIGHT: 157 LBS | BODY MASS INDEX: 23.25 KG/M2 | SYSTOLIC BLOOD PRESSURE: 168 MMHG | HEART RATE: 66 BPM | OXYGEN SATURATION: 96 % | DIASTOLIC BLOOD PRESSURE: 82 MMHG | RESPIRATION RATE: 16 BRPM

## 2018-02-14 DIAGNOSIS — R73.03 PREDIABETES: Primary | ICD-10-CM

## 2018-02-14 DIAGNOSIS — I10 ESSENTIAL HYPERTENSION: ICD-10-CM

## 2018-02-14 RX ORDER — HYDROCHLOROTHIAZIDE 25 MG/1
TABLET ORAL
Qty: 30 TAB | Refills: 6 | Status: SHIPPED | OUTPATIENT
Start: 2018-02-14 | End: 2018-06-13 | Stop reason: SDUPTHER

## 2018-02-14 NOTE — MR AVS SNAPSHOT
[facility] 
 
 
 [address] [address] [address] [address] 
[phone] Patient: Estelle Ramos MRN: [id number] Visit Information Date & Time Provider Department Dept. Phone Encounter #  
 2/14/2018  3:20 PM Erin Wing [address] [place] [address] [phone] [id number] Follow-up Instructions Return in about 3 months (around 5/14/2018) for BP. Upcoming Health Maintenance Date Due ZOSTER VACCINE AGE 60> 9/7/2015 PAP AKA CERVICAL CYTOLOGY 2/3/2019 BREAST CANCER SCRN MAMMOGRAM 2/15/2019 COLONOSCOPY 2/3/2026 DTaP/Tdap/Td series (2 - Td) 11/15/2027 Allergies as of 2/14/2018  Review Complete On: 2/14/2018 By: Erin Wing MD  
  
 Severity Noted Reaction Type Reactions Codeine High 08/16/2010    Nausea and Vomiting Violent vomiting Ace Inhibitors  08/16/2010   Side Effect Cough Clonidine  05/29/2012    Other (comments) Visual disturbance Hyzaar [Losartan-hydrochlorothiazide]  12/08/2011    Other (comments) Palpitations. Able to tolerate HCTZ Current Immunizations  Never Reviewed Name Date Influenza Vaccine 9/1/2017 Not reviewed this visit You Were Diagnosed With   
  
 Codes Comments Prediabetes    -  Primary ICD-10-CM: R73.03 
ICD-9-CM: 790.29 Essential hypertension     ICD-10-CM: I10 
ICD-9-CM: 401.9 Vitals BP Pulse Temp Resp Height(growth percentile) Weight(growth percentile) 181/82 (BP 1 Location: Right arm, BP Patient Position: Sitting) 66 98.4 °F (36.9 °C) (Oral) 16 5' 9\" (1.753 m) 157 lb (71.2 kg) SpO2 BMI OB Status Smoking Status 96% 23.18 kg/m2 Postmenopausal Never Smoker BMI and BSA Data Body Mass Index Body Surface Area  
 23.18 kg/m 2 1.86 m 2 Preferred Pharmacy Pharmacy Name Phone [id number], [facility] [phone] Your Updated Medication List  
  
   
This list is accurate as of: 2/14/18  4:03 PM.  Always use your most recent med list.  
  
  
  
  
 hydroCHLOROthiazide 25 mg tablet Commonly known as:  HYDRODIURIL  
take 1 tablet by mouth once daily JINTELI 1-5 mg-mcg Tab Generic drug:  norethindrone acetate-ethinyl estradiol Take  by mouth. METAMUCIL (SUGAR) Powd Generic drug:  psyllium seed-sucrose Take  by mouth.  
  
 metoprolol succinate 100 mg tablet Commonly known as:  TOPROL-XL  
take 1 tablet by mouth once daily [address] Take  by mouth. MULTI-VITAMIN PO Take 1 Tab by mouth daily. Prescriptions Sent to Pharmacy Refills  
 hydroCHLOROthiazide (HYDRODIURIL) 25 mg tablet 6 Sig: take 1 tablet by mouth once daily Class: Normal  
 Pharmacy: [id number] [address] [place] [address], [address] [place] [address] Ph #: [phone] We Performed the Following AMB POC HEMOGLOBIN A1C [44187 CPT(R)] Follow-up Instructions Return in about 3 months (around 5/14/2018) for BP. Patient Instructions Low Sodium Diet (2,000 Milligram): Care Instructions Your Care Instructions Too much sodium causes your body to hold on to extra water. This can raise your blood pressure and force your heart and kidneys to work harder. In very serious cases, this could cause you to be put in the hospital. It might even be life-threatening. By limiting sodium, you will feel better and lower your risk of serious problems. The most common source of sodium is salt. People get most of the salt in their diet from canned, prepared, and packaged foods. Fast food and restaurant meals also are very high in sodium. Your doctor will probably limit your sodium to less than 2,000 milligrams (mg) a day. This limit counts all the sodium in prepared and packaged foods and any salt you add to your food. Follow-up care is a key part of your treatment and safety. Be sure to make and go to all appointments, and call your doctor if you are having problems. It's also a good idea to know your test results and keep a list of the medicines you take. How can you care for yourself at home? Read food labels · Read labels on cans and food packages. The labels tell you how much sodium is in each serving. Make sure that you look at the serving size. If you eat more than the serving size, you have eaten more sodium. · Food labels also tell you the Percent Daily Value for sodium. Choose products with low Percent Daily Values for sodium. · Be aware that sodium can come in forms other than salt, including monosodium glutamate (MSG), sodium citrate, and sodium bicarbonate (baking soda). MSG is often added to Asian food. When you eat out, you can sometimes ask for food without MSG or added salt. Buy low-sodium foods · Buy foods that are labeled \"unsalted\" (no salt added), \"sodium-free\" (less than 5 mg of sodium per serving), or \"low-sodium\" (less than 140 mg of sodium per serving). Foods labeled \"reduced-sodium\" and \"light sodium\" may still have too much sodium. Be sure to read the label to see how much sodium you are getting. · Buy fresh vegetables, or frozen vegetables without added sauces. Buy low-sodium versions of canned vegetables, soups, and other canned goods. Prepare low-sodium meals · Cut back on the amount of salt you use in cooking. This will help you adjust to the taste. Do not add salt after cooking. One teaspoon of salt has about 2,300 mg of sodium. · Take the salt shaker off the table. · Flavor your food with garlic, lemon juice, onion, vinegar, herbs, and spices. Do not use soy sauce, lite soy sauce, steak sauce, onion salt, garlic salt, celery salt, mustard, or ketchup on your food. · Use low-sodium salad dressings, sauces, and ketchup. Or make your own salad dressings and sauces without adding salt. · Use less salt (or none) when recipes call for it. You can often use half the salt a recipe calls for without losing flavor. Other foods such as rice, pasta, and grains do not need added salt. · Rinse canned vegetables, and cook them in fresh water. This removes some-but not all-of the salt. · Avoid water that is naturally high in sodium or that has been treated with water softeners, which add sodium. Call your local water company to find out the sodium content of your water supply. If you buy bottled water, read the label and choose a sodium-free brand. Avoid high-sodium foods · Avoid eating: ¨ Smoked, cured, salted, and canned meat, fish, and poultry. ¨ Ham, rodriguez, hot dogs, and luncheon meats. ¨ Regular, hard, and processed cheese and regular peanut butter. ¨ Crackers with salted tops, and other salted snack foods such as pretzels, chips, and salted popcorn. ¨ Frozen prepared meals, unless labeled low-sodium. ¨ Canned and dried soups, broths, and bouillon, unless labeled sodium-free or low-sodium. ¨ Canned vegetables, unless labeled sodium-free or low-sodium. ¨ Asad Lax fries, pizza, tacos, and other fast foods. ¨ Pickles, olives, ketchup, and other condiments, especially soy sauce, unless labeled sodium-free or low-sodium. Where can you learn more? Go to http://vidya-arcadio.info/. Enter L059 in the search box to learn more about \"Low Sodium Diet (2,000 Milligram): Care Instructions. \" Current as of: May 12, 2017 Content Version: 11.4 © 0267-0146 [employer]indicia. Care instructions adapted under license by [employer] (which disclaims liability or warranty for this information). If you have questions about a medical condition or this instruction, always ask your healthcare professional. Tej Lopez any warranty or liability for your use of this information. Introducing [facility]! Dear Isaura Garcia: Thank you for requesting a W[employer] account. Our records indicate that you already have an active W[employer] account. You can access your account anytime at [email or web address] Did you know that you can access your hospital and ER discharge instructions at any time in Wearhaus? You can also review all of your test results from your hospital stay or ER visit. Additional Information If you have questions, please visit the Frequently Asked Questions section of the W[employer] website at [email or web address] Remember, W[employer] is NOT to be used for urgent needs. For medical emergencies, dial 911. Now available from your iPhone and Android! Please provide this summary of care documentation to your next provider. Your primary care clinician is listed as [address]. If you have any questions after today's visit, please call [phone].

## 2018-02-14 NOTE — PROGRESS NOTES
HISTORY OF PRESENT ILLNESS  Tiffany Reardon is a 58 y.o. female. HPI  Patient is here today for evaluation and treatment of: Prediabetes/Hypertension    Prediabetes:   She has changed her diet ,  She has been eating fewer carbs; she has lost weight  Wt Readings from Last 3 Encounters:   02/14/18 157 lb (71.2 kg)   11/15/17 165 lb (74.8 kg)   04/12/17 161 lb (73 kg)         Hypertension: BP is slightly elevated today; she continues on meds as listed below. She is compliant with med regimen      Current Outpatient Prescriptions:     hydroCHLOROthiazide (HYDRODIURIL) 25 mg tablet, take 1 tablet by mouth once daily, Disp: 30 Tab, Rfl: 3    GLUCOSAM/CHOND/HYALU/CF BORATE (CoverHound PO), Take  by mouth., Disp: , Rfl:     metoprolol succinate (TOPROL-XL) 100 mg tablet, take 1 tablet by mouth once daily, Disp: 30 Tab, Rfl: 6    ethinyl estradiol-norethindron (JINTELI) 1-5 mg-mcg Tab, Take  by mouth., Disp: , Rfl:     psyllium seed-sucrose (METAMUCIL) Powd, Take  by mouth., Disp: , Rfl:     MULTI-VITAMIN PO, Take 1 Tab by mouth daily. , Disp: , Rfl:     Review of Systems   Constitutional: Negative for chills and fever. Respiratory: Negative for shortness of breath and wheezing. Cardiovascular: Negative for chest pain and palpitations. Physical Exam   Constitutional: She appears well-developed and well-nourished. No distress. Cardiovascular: Normal rate and regular rhythm. Exam reveals no gallop and no friction rub. No murmur heard. Pulmonary/Chest: Breath sounds normal. No respiratory distress. She has no wheezes. She has no rales. Musculoskeletal: She exhibits no edema. Nursing note and vitals reviewed. Visit Vitals    /82    Pulse 66    Temp 98.4 °F (36.9 °C) (Oral)    Resp 16    Ht 5' 9\" (1.753 m)    Wt 157 lb (71.2 kg)    SpO2 96%    BMI 23.18 kg/m2     Last labs reviewed. A1c improved. ASSESSMENT and PLAN    ICD-10-CM ICD-9-CM    1.  Prediabetes R73.03 790.29 AMB POC HEMOGLOBIN A1C   2. Essential hypertension I10 401.9 hydroCHLOROthiazide (HYDRODIURIL) 25 mg tablet       As above,    above all stable unless otherwise noted  Labs as ordered  Continue current meds as ordered  Follow-up Disposition:  Return in about 4 months (around 6/14/2018) for BP. An After Visit Summary was printed and given to the patient. This has been fully explained to the patient, who indicates understanding.

## 2018-02-14 NOTE — PATIENT INSTRUCTIONS

## 2018-02-14 NOTE — PROGRESS NOTES
1. Have you been to the ER, urgent care clinic since your last visit? Hospitalized since your last visit? No    2. Have you seen or consulted any other health care providers outside of the [address] [place] [address] since your last visit? Include any pap smears or colon screening.  No

## 2018-06-05 RX ORDER — METOPROLOL SUCCINATE 100 MG/1
TABLET, EXTENDED RELEASE ORAL
Qty: 30 TAB | Refills: 6 | Status: SHIPPED | OUTPATIENT
Start: 2018-06-05 | End: 2019-01-16 | Stop reason: SDUPTHER

## 2018-06-13 ENCOUNTER — OFFICE VISIT (OUTPATIENT)
Dept: FAMILY MEDICINE CLINIC | Age: 63
End: 2018-06-13

## 2018-06-13 ENCOUNTER — HOSPITAL ENCOUNTER (OUTPATIENT)
Dept: LAB | Age: 63
Discharge: HOME | End: 2018-06-13
Payer: COMMERCIAL

## 2018-06-13 VITALS
SYSTOLIC BLOOD PRESSURE: 138 MMHG | BODY MASS INDEX: 23.11 KG/M2 | RESPIRATION RATE: 16 BRPM | HEART RATE: 71 BPM | WEIGHT: 156 LBS | DIASTOLIC BLOOD PRESSURE: 70 MMHG | TEMPERATURE: 98.1 F | HEIGHT: 69 IN | OXYGEN SATURATION: 98 %

## 2018-06-13 DIAGNOSIS — I10 ESSENTIAL HYPERTENSION: ICD-10-CM

## 2018-06-13 LAB
ANION GAP SERPL CALC-SCNC: 8 MMOL/L (ref 3–18)
BUN SERPL-MCNC: 15 MG/DL (ref 7–18)
BUN/CREAT SERPL: 15 (ref 12–20)
CALCIUM SERPL-MCNC: 9.7 MG/DL (ref 8.5–10.1)
CHLORIDE SERPL-SCNC: 104 MMOL/L (ref 100–108)
CO2 SERPL-SCNC: 28 MMOL/L (ref 21–32)
CREAT SERPL-MCNC: 0.99 MG/DL (ref 0.6–1.3)
GLUCOSE SERPL-MCNC: 98 MG/DL (ref 74–99)
POTASSIUM SERPL-SCNC: 4.2 MMOL/L (ref 3.5–5.5)
SODIUM SERPL-SCNC: 140 MMOL/L (ref 136–145)

## 2018-06-13 PROCEDURE — 36415 COLL VENOUS BLD VENIPUNCTURE: CPT | Performed by: FAMILY MEDICINE

## 2018-06-13 PROCEDURE — 80048 BASIC METABOLIC PNL TOTAL CA: CPT | Performed by: FAMILY MEDICINE

## 2018-06-13 RX ORDER — HYDROCHLOROTHIAZIDE 25 MG/1
TABLET ORAL
Qty: 30 TAB | Refills: 6 | Status: SHIPPED | OUTPATIENT
Start: 2018-06-13 | End: 2019-01-16 | Stop reason: SDUPTHER

## 2018-06-13 NOTE — PATIENT INSTRUCTIONS

## 2018-06-13 NOTE — PROGRESS NOTES
1. Have you been to the ER, urgent care clinic since your last visit? Hospitalized since your last visit? No    2. Have you seen or consulted any other health care providers outside of the [address] [place] [address] since your last visit? Include any pap smears or colon screening.  No

## 2018-06-13 NOTE — PROGRESS NOTES
HISTORY OF PRESENT ILLNESS  Tiffany Craft is a 58 y.o. female. HPI  Patient is here today for evaluation and treatment of: Hypertension    Hypertension:  BP is elevated today. Home BPs are very stable . She thinks metoprolol causes more constipation. she thinks she has white coat HTN. She feels well; she has no new complaints except for the constipation. Current Outpatient Prescriptions:     multivitamin with minerals (HAIR,SKIN AND NAILS PO), Take  by mouth., Disp: , Rfl:     metoprolol succinate (TOPROL-XL) 100 mg tablet, take 1 tablet by mouth once daily, Disp: 30 Tab, Rfl: 6    hydroCHLOROthiazide (HYDRODIURIL) 25 mg tablet, take 1 tablet by mouth once daily, Disp: 30 Tab, Rfl: 6    GLUCOSAM/CHOND/HYALU/CF BORATE (vushaper PO), Take  by mouth., Disp: , Rfl:     ethinyl estradiol-norethindron (JINTELI) 1-5 mg-mcg Tab, Take  by mouth., Disp: , Rfl:     psyllium seed-sucrose (METAMUCIL) Powd, Take  by mouth., Disp: , Rfl:     MULTI-VITAMIN PO, Take 1 Tab by mouth daily. , Disp: , Rfl:     PMH,  Meds, Allergies, Family History, Social history reviewed    Review of Systems   Constitutional: Negative for fever. Respiratory: Negative for shortness of breath and wheezing. Cardiovascular: Negative for chest pain and palpitations. Gastrointestinal: Positive for constipation.        Physical Exam   Visit Vitals    /70    Pulse 71    Temp 98.1 °F (36.7 °C) (Oral)    Resp 16    Ht 5' 9\" (1.753 m)    Wt 156 lb (70.8 kg)    SpO2 98%    BMI 23.04 kg/m2     General appearance: alert, cooperative, no distress, appears stated age  Neck: supple, symmetrical, trachea midline, no adenopathy, thyroid: not enlarged, symmetric, no tenderness/mass/nodules, no carotid bruit and no JVD  Lungs: clear to auscultation bilaterally  Heart: regular rate and rhythm, S1, S2 normal, no murmur, click, rub or gallop  Extremities: extremities normal, atraumatic, no cyanosis or edema    Lab Results   Component Value Date/Time    Cholesterol, total 148 11/07/2017 06:30 AM    HDL Cholesterol 45 11/07/2017 06:30 AM    LDL, calculated 90.4 11/07/2017 06:30 AM    VLDL, calculated 12.6 11/07/2017 06:30 AM    Triglyceride 63 11/07/2017 06:30 AM    CHOL/HDL Ratio 3.3 11/07/2017 06:30 AM     Lab Results   Component Value Date/Time    Sodium 140 11/07/2017 06:30 AM    Potassium 3.7 11/07/2017 06:30 AM    Chloride 104 11/07/2017 06:30 AM    CO2 30 11/07/2017 06:30 AM    Anion gap 6 11/07/2017 06:30 AM    Glucose 99 11/07/2017 06:30 AM    BUN 14 11/07/2017 06:30 AM    Creatinine 0.96 11/07/2017 06:30 AM    BUN/Creatinine ratio 15 11/07/2017 06:30 AM    GFR est AA >60 11/07/2017 06:30 AM    GFR est non-AA 59 (L) 11/07/2017 06:30 AM    Calcium 9.2 11/07/2017 06:30 AM       ASSESSMENT and PLAN    ICD-10-CM ICD-9-CM    1. Essential hypertension I10 401.9 hydroCHLOROthiazide (HYDRODIURIL) 25 mg tablet      METABOLIC PANEL, BASIC       As above, BP is better at second check; Refilled HCTZ  . recheck BMP  Advised probiotics for constipation  Follow-up Disposition:  Return in about 5 months (around 11/13/2018) for well exam.  An After Visit Summary was printed and given to the patient. This has been fully explained to the patient, who indicates understanding.

## 2018-06-13 NOTE — MR AVS SNAPSHOT
[address] [facility] 
 
 
 [address] [id number] [address] [address] 
[phone] Patient: Sangeeta Last MRN: [id number] Visit Information Date & Time Provider Department Dept. Phone Encounter #  
 6/13/2018  3:40 PM Wilman Vega, [address] [place] [address] [phone] [id number] Follow-up Instructions Return in about 5 months (around 11/13/2018) for well exam. Upcoming Health Maintenance Date Due ZOSTER VACCINE AGE 60> 6/13/2019* Influenza Age 5 to Adult 8/1/2018 PAP AKA CERVICAL CYTOLOGY 2/3/2019 BREAST CANCER SCRN MAMMOGRAM 2/15/2019 COLONOSCOPY 2/3/2026 DTaP/Tdap/Td series (2 - Td) 11/15/2027 *Topic was postponed. The date shown is not the original due date. Allergies as of 6/13/2018  Review Complete On: 6/13/2018 By: Irvin Ray LPN Severity Noted Reaction Type Reactions Codeine High 08/16/2010    Nausea and Vomiting Violent vomiting Ace Inhibitors  08/16/2010   Side Effect Cough Clonidine  05/29/2012    Other (comments) Visual disturbance Hyzaar [Losartan-hydrochlorothiazide]  12/08/2011    Other (comments) Palpitations. Able to tolerate HCTZ Current Immunizations  Never Reviewed Name Date Influenza Vaccine 9/1/2017 Not reviewed this visit You Were Diagnosed With   
  
 Codes Comments Essential hypertension     ICD-10-CM: I10 
ICD-9-CM: 401.9 Vitals BP Pulse Temp Resp Height(growth percentile) Weight(growth percentile) 138/70 71 98.1 °F (36.7 °C) (Oral) 16 5' 9\" (1.753 m) 156 lb (70.8 kg) SpO2 BMI OB Status Smoking Status 98% 23.04 kg/m2 Postmenopausal Never Smoker Vitals History BMI and BSA Data Body Mass Index Body Surface Area 23.04 kg/m 2 1.86 m 2 Preferred Pharmacy Pharmacy Name Phone [facility], [facility] [phone] Your Updated Medication List  
  
   
This list is accurate as of 6/13/18  4:03 PM.  Always use your most recent med list.  
  
  
  
  
 HAIR,SKIN AND NAILS PO Take  by mouth. hydroCHLOROthiazide 25 mg tablet Commonly known as:  HYDRODIURIL  
take 1 tablet by mouth once daily JINTELI 1-5 mg-mcg Tab Generic drug:  norethindrone acetate-ethinyl estradiol Take  by mouth. METAMUCIL (SUGAR) Powd Generic drug:  psyllium seed-sucrose Take  by mouth.  
  
 metoprolol succinate 100 mg tablet Commonly known as:  TOPROL-XL  
take 1 tablet by mouth once daily [address] Take  by mouth. MULTI-VITAMIN PO Take 1 Tab by mouth daily. Prescriptions Sent to Pharmacy Refills  
 hydroCHLOROthiazide (HYDRODIURIL) 25 mg tablet 6 Sig: take 1 tablet by mouth once daily Class: Normal  
 Pharmacy: [facility] [id number] [address] #: [phone] Follow-up Instructions Return in about 5 months (around 11/13/2018) for well exam. To-Do List   
 06/13/2018 Lab:  METABOLIC PANEL, BASIC Patient Instructions DASH Diet: Care Instructions Your Care Instructions The DASH diet is an eating plan that can help lower your blood pressure. DASH stands for Dietary Approaches to Stop Hypertension. Hypertension is high blood pressure. The DASH diet focuses on eating foods that are high in calcium, potassium, and magnesium. These nutrients can lower blood pressure. The foods that are highest in these nutrients are fruits, vegetables, low-fat dairy products, nuts, seeds, and legumes. But taking calcium, potassium, and magnesium supplements instead of eating foods that are high in those nutrients does not have the same effect. The DASH diet also includes whole grains, fish, and poultry.  
The DASH diet is one of several lifestyle changes your doctor may recommend to lower your high blood pressure. Your doctor may also want you to decrease the amount of sodium in your diet. Lowering sodium while following the DASH diet can lower blood pressure even further than just the DASH diet alone. Follow-up care is a key part of your treatment and safety. Be sure to make and go to all appointments, and call your doctor if you are having problems. It's also a good idea to know your test results and keep a list of the medicines you take. How can you care for yourself at home? Following the DASH diet · Eat 4 to 5 servings of fruit each day. A serving is 1 medium-sized piece of fruit, ½ cup chopped or canned fruit, 1/4 cup dried fruit, or 4 ounces (½ cup) of fruit juice. Choose fruit more often than fruit juice. · Eat 4 to 5 servings of vegetables each day. A serving is 1 cup of lettuce or raw leafy vegetables, ½ cup of chopped or cooked vegetables, or 4 ounces (½ cup) of vegetable juice. Choose vegetables more often than vegetable juice. · Get 2 to 3 servings of low-fat and fat-free dairy each day. A serving is 8 ounces of milk, 1 cup of yogurt, or 1 ½ ounces of cheese. · Eat 6 to 8 servings of grains each day. A serving is 1 slice of bread, 1 ounce of dry cereal, or ½ cup of cooked rice, pasta, or cooked cereal. Try to choose whole-grain products as much as possible. · Limit lean meat, poultry, and fish to 2 servings each day. A serving is 3 ounces, about the size of a deck of cards. · Eat 4 to 5 servings of nuts, seeds, and legumes (cooked dried beans, lentils, and split peas) each week. A serving is 1/3 cup of nuts, 2 tablespoons of seeds, or ½ cup of cooked beans or peas. · Limit fats and oils to 2 to 3 servings each day. A serving is 1 teaspoon of vegetable oil or 2 tablespoons of salad dressing. · Limit sweets and added sugars to 5 servings or less a week. A serving is 1 tablespoon jelly or jam, ½ cup sorbet, or 1 cup of lemonade. · Eat less than 2,300 milligrams (mg) of sodium a day. If you limit your sodium to 1,500 mg a day, you can lower your blood pressure even more. Tips for success · Start small. Do not try to make dramatic changes to your diet all at once. You might feel that you are missing out on your favorite foods and then be more likely to not follow the plan. Make small changes, and stick with them. Once those changes become habit, add a few more changes. · Try some of the following: ¨ Make it a goal to eat a fruit or vegetable at every meal and at snacks. This will make it easy to get the recommended amount of fruits and vegetables each day. ¨ Try yogurt topped with fruit and nuts for a snack or healthy dessert. ¨ Add lettuce, tomato, cucumber, and onion to sandwiches. ¨ Combine a ready-made pizza crust with low-fat mozzarella cheese and lots of vegetable toppings. Try using tomatoes, squash, spinach, broccoli, carrots, cauliflower, and onions. ¨ Have a variety of cut-up vegetables with a low-fat dip as an appetizer instead of chips and dip. ¨ Sprinkle sunflower seeds or chopped almonds over salads. Or try adding chopped walnuts or almonds to cooked vegetables. ¨ Try some vegetarian meals using beans and peas. Add garbanzo or kidney beans to salads. Make burritos and tacos with mashed narayanan beans or black beans. Where can you learn more? Go to http://vidya-arcadio.info/. Enter S921 in the search box to learn more about \"DASH Diet: Care Instructions. \" Current as of: September 21, 2016 Content Version: 11.4 © 2585-4499 [employer]. Care instructions adapted under license by [employer] (which disclaims liability or warranty for this information). If you have questions about a medical condition or this instruction, always ask your healthcare professional. [address] any warranty or liability for your use of this information. DASH Diet: Care Instructions Your Care Instructions The DASH diet is an eating plan that can help lower your blood pressure. DASH stands for Dietary Approaches to Stop Hypertension. Hypertension is high blood pressure. The DASH diet focuses on eating foods that are high in calcium, potassium, and magnesium. These nutrients can lower blood pressure. The foods that are highest in these nutrients are fruits, vegetables, low-fat dairy products, nuts, seeds, and legumes. But taking calcium, potassium, and magnesium supplements instead of eating foods that are high in those nutrients does not have the same effect. The DASH diet also includes whole grains, fish, and poultry. The DASH diet is one of several lifestyle changes your doctor may recommend to lower your high blood pressure. Your doctor may also want you to decrease the amount of sodium in your diet. Lowering sodium while following the DASH diet can lower blood pressure even further than just the DASH diet alone. Follow-up care is a key part of your treatment and safety. Be sure to make and go to all appointments, and call your doctor if you are having problems. It's also a good idea to know your test results and keep a list of the medicines you take. How can you care for yourself at home? Following the DASH diet · Eat 4 to 5 servings of fruit each day. A serving is 1 medium-sized piece of fruit, ½ cup chopped or canned fruit, 1/4 cup dried fruit, or 4 ounces (½ cup) of fruit juice. Choose fruit more often than fruit juice. · Eat 4 to 5 servings of vegetables each day. A serving is 1 cup of lettuce or raw leafy vegetables, ½ cup of chopped or cooked vegetables, or 4 ounces (½ cup) of vegetable juice. Choose vegetables more often than vegetable juice. · Get 2 to 3 servings of low-fat and fat-free dairy each day. A serving is 8 ounces of milk, 1 cup of yogurt, or 1 ½ ounces of cheese. · Eat 6 to 8 servings of grains each day. A serving is 1 slice of bread, 1 ounce of dry cereal, or ½ cup of cooked rice, pasta, or cooked cereal. Try to choose whole-grain products as much as possible. · Limit lean meat, poultry, and fish to 2 servings each day. A serving is 3 ounces, about the size of a deck of cards. · Eat 4 to 5 servings of nuts, seeds, and legumes (cooked dried beans, lentils, and split peas) each week. A serving is 1/3 cup of nuts, 2 tablespoons of seeds, or ½ cup of cooked beans or peas. · Limit fats and oils to 2 to 3 servings each day. A serving is 1 teaspoon of vegetable oil or 2 tablespoons of salad dressing. · Limit sweets and added sugars to 5 servings or less a week. A serving is 1 tablespoon jelly or jam, ½ cup sorbet, or 1 cup of lemonade. · Eat less than 2,300 milligrams (mg) of sodium a day. If you limit your sodium to 1,500 mg a day, you can lower your blood pressure even more. Tips for success · Start small. Do not try to make dramatic changes to your diet all at once. You might feel that you are missing out on your favorite foods and then be more likely to not follow the plan. Make small changes, and stick with them. Once those changes become habit, add a few more changes. · Try some of the following: ¨ Make it a goal to eat a fruit or vegetable at every meal and at snacks. This will make it easy to get the recommended amount of fruits and vegetables each day. ¨ Try yogurt topped with fruit and nuts for a snack or healthy dessert. ¨ Add lettuce, tomato, cucumber, and onion to sandwiches. ¨ Combine a ready-made pizza crust with low-fat mozzarella cheese and lots of vegetable toppings. Try using tomatoes, squash, spinach, broccoli, carrots, cauliflower, and onions. ¨ Have a variety of cut-up vegetables with a low-fat dip as an appetizer instead of chips and dip. ¨ Sprinkle sunflower seeds or chopped almonds over salads.  Or try adding chopped walnuts or almonds to cooked vegetables. ¨ Try some vegetarian meals using beans and peas. Add garbanzo or kidney beans to salads. Make burritos and tacos with mashed narayanan beans or black beans. Where can you learn more? Go to http://vidya-arcadio.info/. Enter A014 in the search box to learn more about \"DASH Diet: Care Instructions. \" Current as of: September 21, 2016 Content Version: 11.4 © 4420-4086 [employer]. Care instructions adapted under license by [employer] (which disclaims liability or warranty for this information). If you have questions about a medical condition or this instruction, always ask your healthcare professional. [address] any warranty or liability for your use of this information. Introducing [facility]! Dear Aureliano Milian: Thank you for requesting a [employer] account. Our records indicate that you already have an active [employer] account. You can access your account anytime at [email or web address]. Causes/[employer] Did you know that you can access your hospital and ER discharge instructions at any time in [employer]? You can also review all of your test results from your hospital stay or ER visit. Additional Information If you have questions, please visit the Frequently Asked Questions section of the [employer] website at [email or web address]. Causes/[employer]/. Remember, [employer] is NOT to be used for urgent needs. For medical emergencies, dial 911. Now available from your iPhone and Android! Please provide this summary of care documentation to your next provider. Your primary care clinician is listed as [address]. If you have any questions after today's visit, please call [phone].

## 2019-01-16 ENCOUNTER — OFFICE VISIT (OUTPATIENT)
Dept: FAMILY MEDICINE CLINIC | Age: 64
End: 2019-01-16

## 2019-01-16 VITALS
BODY MASS INDEX: 23.99 KG/M2 | DIASTOLIC BLOOD PRESSURE: 70 MMHG | HEIGHT: 69 IN | WEIGHT: 162 LBS | OXYGEN SATURATION: 97 % | SYSTOLIC BLOOD PRESSURE: 130 MMHG | HEART RATE: 68 BPM | RESPIRATION RATE: 16 BRPM | TEMPERATURE: 98 F

## 2019-01-16 DIAGNOSIS — Z12.11 SCREENING FOR COLON CANCER: ICD-10-CM

## 2019-01-16 DIAGNOSIS — R73.9 ELEVATED BLOOD SUGAR: ICD-10-CM

## 2019-01-16 DIAGNOSIS — Z13.6 SCREENING FOR CARDIOVASCULAR CONDITION: ICD-10-CM

## 2019-01-16 DIAGNOSIS — Z00.00 WELL WOMAN EXAM (NO GYNECOLOGICAL EXAM): Primary | ICD-10-CM

## 2019-01-16 DIAGNOSIS — I10 ESSENTIAL HYPERTENSION: ICD-10-CM

## 2019-01-16 RX ORDER — METOPROLOL SUCCINATE 100 MG/1
TABLET, EXTENDED RELEASE ORAL
Qty: 30 TAB | Refills: 6 | Status: SHIPPED | OUTPATIENT
Start: 2019-01-16 | End: 2019-07-16 | Stop reason: SDUPTHER

## 2019-01-16 RX ORDER — HYDROCHLOROTHIAZIDE 25 MG/1
TABLET ORAL
Qty: 30 TAB | Refills: 6 | Status: SHIPPED | OUTPATIENT
Start: 2019-01-16 | End: 2019-07-16 | Stop reason: SDUPTHER

## 2019-01-16 NOTE — PATIENT INSTRUCTIONS
Well Visit, Women 48 to 72: Care Instructions Your Care Instructions Physical exams can help you stay healthy. Your doctor has checked your overall health and may have suggested ways to take good care of yourself. He or she also may have recommended tests. At home, you can help prevent illness with healthy eating, regular exercise, and other steps. Follow-up care is a key part of your treatment and safety. Be sure to make and go to all appointments, and call your doctor if you are having problems. It's also a good idea to know your test results and keep a list of the medicines you take. How can you care for yourself at home? · Reach and stay at a healthy weight. This will lower your risk for many problems, such as obesity, diabetes, heart disease, and high blood pressure. · Get at least 30 minutes of exercise on most days of the week. Walking is a good choice. You also may want to do other activities, such as running, swimming, cycling, or playing tennis or team sports. · Do not smoke. Smoking can make health problems worse. If you need help quitting, talk to your doctor about stop-smoking programs and medicines. These can increase your chances of quitting for good. · Protect your skin from too much sun. When you're outdoors from 10 a.m. to 4 p.m., stay in the shade or cover up with clothing and a hat with a wide brim. Wear sunglasses that block UV rays. Even when it's cloudy, put broad-spectrum sunscreen (SPF 30 or higher) on any exposed skin. · See a dentist one or two times a year for checkups and to have your teeth cleaned. · Wear a seat belt in the car. · Limit alcohol to 1 drink a day. Too much alcohol can cause health problems. Follow your doctor's advice about when to have certain tests. These tests can spot problems early. · Cholesterol.  Your doctor will tell you how often to have this done based on your age, family history, or other things that can increase your risk for heart attack and stroke. · Blood pressure. Have your blood pressure checked during a routine doctor visit. Your doctor will tell you how often to check your blood pressure based on your age, your blood pressure results, and other factors. · Mammogram. Ask your doctor how often you should have a mammogram, which is an X-ray of your breasts. A mammogram can spot breast cancer before it can be felt and when it is easiest to treat. · Pap test and pelvic exam. Ask your doctor how often you should have a Pap test. You may not need to have a Pap test as often as you used to. · Vision. Have your eyes checked every year or two or as often as your doctor suggests. Some experts recommend that you have yearly exams for glaucoma and other age-related eye problems starting at age 48. · Hearing. Tell your doctor if you notice any change in your hearing. You can have tests to find out how well you hear. · Diabetes. Ask your doctor whether you should have tests for diabetes. · Colon cancer. You should begin tests for colon cancer at age 48. You may have one of several tests. Your doctor will tell you how often to have tests based on your age and risk. Risks include whether you already had a precancerous polyp removed from your colon or whether your parents, sisters and brothers, or children have had colon cancer. · Thyroid disease. Talk to your doctor about whether to have your thyroid checked as part of a regular physical exam. Women have an increased chance of a thyroid problem. · Osteoporosis. You should begin tests for bone density at age 72. If you are younger than 72, ask your doctor whether you have factors that may increase your risk for this disease. You may want to have this test before age 72. · Heart attack and stroke risk. At least every 4 to 6 years, you should have your risk for heart attack and stroke assessed.  Your doctor uses factors such as your age, blood pressure, cholesterol, and whether you smoke or have diabetes to show what your risk for a heart attack or stroke is over the next 10 years. When should you call for help? Watch closely for changes in your health, and be sure to contact your doctor if you have any problems or symptoms that concern you. Where can you learn more? Go to http://vidya-arcadio.info/. Enter Y940 in the search box to learn more about \"Well Visit, Women 50 to 72: Care Instructions. \" Current as of: March 29, 2018 Content Version: 11.8 © 6158-9731 [employer]. Care instructions adapted under license by [employer] (which disclaims liability or warranty for this information). If you have questions about a medical condition or this instruction, always ask your healthcare professional. [address] any warranty or liability for your use of this information.

## 2019-01-16 NOTE — PROGRESS NOTES
1. Have you been to the ER, urgent care clinic since your last visit? Hospitalized since your last visit? Yes Where: [facility] 
 
2. Have you seen or consulted any other health care providers outside of the [address] [place] [address] since your last visit? Include any pap smears or colon screening.  [facility]

## 2019-01-16 NOTE — PROGRESS NOTES
Subjective:  
61 y.o. female for Well Woman Check. Her gyne and breast care is done elsewhere by her Ob-Gyne physician. Home BPs are in the 130s-140s/ 80s at home. Has had some stressors. Patient Active Problem List  
 Diagnosis Date Noted  Hot flashes  Prediabetes  De Quervain's tenosynovitis, left 12/22/2015  
 HTN (hypertension) 08/16/2010  Hypercholesterolemia 08/16/2010 Current Outpatient Medications Medication Sig Dispense Refill  hydroCHLOROthiazide (HYDRODIURIL) 25 mg tablet take 1 tablet by mouth once daily 30 Tab 6  
 metoprolol succinate (TOPROL-XL) 100 mg tablet take 1 tablet by mouth once daily 30 Tab 6  
 multivitamin with minerals (HAIR,SKIN AND NAILS PO) Take  by mouth.  GLUCOSAM/CHOND/HYALU/CF BORATE (MOVE FREE JOINT HEALTH PO) Take  by mouth.  ethinyl estradiol-norethindron (JINTELI) 1-5 mg-mcg Tab Take 1 Tab by mouth daily.  psyllium seed-sucrose (METAMUCIL) Powd Take  by mouth.  MULTI-VITAMIN PO Take 1 Tab by mouth daily. Allergies Allergen Reactions  Codeine Nausea and Vomiting Violent vomiting  Ace Inhibitors Cough  Clonidine Other (comments) Visual disturbance  Hyzaar [Losartan-Hydrochlorothiazide] Other (comments) Palpitations. Able to tolerate HCTZ Past Medical History:  
Diagnosis Date  Anemia NEC  Arthritis  Carpal tunnel syndrome   
 without loss of sensation in the right hand  Hot flashes  Hypercholesterolemia  Hypercholesterolemia 8/16/2010  Hypertension  Numbness and tingling   
 fingers  Prediabetes  Right wrist pain  Stenosing tenosynovitis of wrist   
 first dorsal compartment, right wrist  
 
Past Surgical History:  
Procedure Laterality Date  HX GYN Fibroid Emobliation 2001 [address] Family History Problem Relation Age of Onset  Hypertension Mother  Heart Disease Mother  Hypertension Father  Hypertension Brother  Arthritis-osteo Other Social History Tobacco Use  Smoking status: Never Smoker  Smokeless tobacco: Never Used Substance Use Topics  Alcohol use: No  
  
 
Lab Results Component Value Date/Time WBC 8.1 11/15/2017 04:35 PM  
 HGB 12.7 11/15/2017 04:35 PM  
 HCT 37.8 11/15/2017 04:35 PM  
 PLATELET 580 79/43/0166 04:35 PM  
 MCV 93.6 11/15/2017 04:35 PM  
 
Lab Results Component Value Date/Time Cholesterol, total 148 11/07/2017 06:30 AM  
 HDL Cholesterol 45 11/07/2017 06:30 AM  
 LDL, calculated 90.4 11/07/2017 06:30 AM  
 Triglyceride 63 11/07/2017 06:30 AM  
 CHOL/HDL Ratio 3.3 11/07/2017 06:30 AM  
 
Lab Results Component Value Date/Time ALT (SGPT) 38 05/29/2012 08:33 AM  
 AST (SGOT) 29 05/29/2012 08:33 AM  
 Alk. phosphatase 110 05/29/2012 08:33 AM  
 Bilirubin, total 0.2 05/29/2012 08:33 AM  
 Albumin 4.6 05/29/2012 08:33 AM  
 Protein, total 7.3 05/29/2012 08:33 AM  
 PLATELET 618 00/51/2866 04:35 PM  
 
Lab Results Component Value Date/Time GFR est non-AA 57 (L) 06/13/2018 04:10 PM  
 GFR est AA >60 06/13/2018 04:10 PM  
 Creatinine 0.99 06/13/2018 04:10 PM  
 BUN 15 06/13/2018 04:10 PM  
 Sodium 140 06/13/2018 04:10 PM  
 Potassium 4.2 06/13/2018 04:10 PM  
 Chloride 104 06/13/2018 04:10 PM  
 CO2 28 06/13/2018 04:10 PM  
  
 
ROS: Feeling generally well. No TIA's or unusual headaches, no dysphagia. No prolonged cough. No dyspnea or chest pain on exertion. No abdominal pain, change in bowel habits, black or bloody stools. No urinary tract symptoms. No new or unusual musculoskeletal symptoms. Specific concerns today: none. Objective: The patient appears well, alert, oriented x 3, in no distress. Visit Vitals /70 Pulse 68 Temp 98 °F (36.7 °C) (Oral) Resp 16 Ht 5' 9\" (1.753 m) Wt 162 lb (73.5 kg) SpO2 97% BMI 23.92 kg/m² ENT normal.  Neck supple. No adenopathy or thyromegaly. SHERI.  Lungs are clear, good air entry, no wheezes, rhonchi or rales. S1 and S2 normal, no murmurs, regular rate and rhythm. Abdomen soft without tenderness, guarding, mass or organomegaly. Extremities show no edema, normal peripheral pulses. Neurological is normal, no focal findings. Breast and Pelvic exams are deferred. Assessment/Plan:  
Well Woman- stable 
increase physical activity, follow low fat diet, follow low salt diet, continue present plan, routine labs ordered ICD-10-CM ICD-9-CM 1. Well woman exam (no gynecological exam) Z00.00 V70.0 [V70.0] 2. Essential hypertension- for refill only I10 401.9 hydroCHLOROthiazide (HYDRODIURIL) 25 mg tablet 3. Screening for colon cancer Z12.11 V76.51 COLOGUARD TEST (FECAL DNA COLORECTAL CANCER SCREENING) 4. Screening for cardiovascular condition Z13.6 V81.2 LIPID PANEL  
   METABOLIC PANEL, BASIC 5. Elevated blood sugar- for lab only R73.9 790.29 HEMOGLOBIN A1C WITH EAG As above,  
above all stable unless otherwise noted 
 treatment plan as listed below Orders Placed This Encounter  COLOGUARD TEST (FECAL DNA COLORECTAL CANCER SCREENING)  LIPID PANEL  
 METABOLIC PANEL, BASIC  
 HEMOGLOBIN A1C WITH EAG  
 hydroCHLOROthiazide (HYDRODIURIL) 25 mg tablet  metoprolol succinate (TOPROL-XL) 100 mg tablet Follow-up Disposition: 
Return in about 6 months (around 7/16/2019) for htn. An After Visit Summary was printed and given to the patient. Advised healthy diet and exercise as tolerated;  BMI reviewed ACP note given/documented

## 2019-01-16 NOTE — ACP (ADVANCE CARE PLANNING)
Advance Care Planning (ACP) Provider Conversation Snapshot    Date of ACP Conversation: 01/16/19  Persons included in Conversation:  patient  Length of ACP Conversation in minutes:  <16 minutes (Non-Billable)    Authorized Decision Maker (if patient is incapable of making informed decisions):    This person is:   na          For Patients with Decision Making Capacity:   tbd    Conversation Outcomes / Follow-Up Plan:   Recommended completion of Advance Directive form after review of ACP materials and conversation with prospective healthcare agent   Recommended communicating the plan and making copies for the healthcare agent, personal physician, and others as appropriate (e.g., health system)

## 2019-07-03 ENCOUNTER — HOSPITAL ENCOUNTER (OUTPATIENT)
Dept: LAB | Age: 64
Discharge: HOME | End: 2019-07-03
Payer: COMMERCIAL

## 2019-07-03 DIAGNOSIS — R73.9 ELEVATED BLOOD SUGAR: ICD-10-CM

## 2019-07-03 DIAGNOSIS — Z13.6 SCREENING FOR CARDIOVASCULAR CONDITION: ICD-10-CM

## 2019-07-03 LAB
ANION GAP SERPL CALC-SCNC: 6 MMOL/L (ref 3–18)
BUN SERPL-MCNC: 15 MG/DL (ref 7–18)
BUN/CREAT SERPL: 15 (ref 12–20)
CALCIUM SERPL-MCNC: 10.1 MG/DL (ref 8.5–10.1)
CHLORIDE SERPL-SCNC: 105 MMOL/L (ref 100–108)
CHOLEST SERPL-MCNC: 149 MG/DL
CO2 SERPL-SCNC: 30 MMOL/L (ref 21–32)
CREAT SERPL-MCNC: 1.03 MG/DL (ref 0.6–1.3)
EST. AVERAGE GLUCOSE BLD GHB EST-MCNC: 134 MG/DL
GLUCOSE SERPL-MCNC: 108 MG/DL (ref 74–99)
HBA1C MFR BLD: 6.3 % (ref 4.2–5.6)
HDLC SERPL-MCNC: 40 MG/DL (ref 40–60)
HDLC SERPL: 3.7 {RATIO} (ref 0–5)
LDLC SERPL CALC-MCNC: 90 MG/DL (ref 0–100)
LIPID PROFILE,FLP: NORMAL
POTASSIUM SERPL-SCNC: 3.7 MMOL/L (ref 3.5–5.5)
SODIUM SERPL-SCNC: 141 MMOL/L (ref 136–145)
TRIGL SERPL-MCNC: 95 MG/DL (ref ?–150)
VLDLC SERPL CALC-MCNC: 19 MG/DL

## 2019-07-03 PROCEDURE — 36415 COLL VENOUS BLD VENIPUNCTURE: CPT

## 2019-07-03 PROCEDURE — 83036 HEMOGLOBIN GLYCOSYLATED A1C: CPT

## 2019-07-03 PROCEDURE — 80061 LIPID PANEL: CPT

## 2019-07-03 PROCEDURE — 80048 BASIC METABOLIC PNL TOTAL CA: CPT

## 2019-07-16 ENCOUNTER — OFFICE VISIT (OUTPATIENT)
Dept: FAMILY MEDICINE CLINIC | Age: 64
End: 2019-07-16

## 2019-07-16 VITALS
BODY MASS INDEX: 24.17 KG/M2 | HEIGHT: 69 IN | DIASTOLIC BLOOD PRESSURE: 82 MMHG | WEIGHT: 163.2 LBS | TEMPERATURE: 98 F | HEART RATE: 67 BPM | OXYGEN SATURATION: 100 % | RESPIRATION RATE: 18 BRPM | SYSTOLIC BLOOD PRESSURE: 140 MMHG

## 2019-07-16 DIAGNOSIS — R73.03 PREDIABETES: ICD-10-CM

## 2019-07-16 DIAGNOSIS — I10 ESSENTIAL HYPERTENSION: Primary | ICD-10-CM

## 2019-07-16 RX ORDER — METOPROLOL SUCCINATE 100 MG/1
TABLET, EXTENDED RELEASE ORAL
Qty: 30 TAB | Refills: 6 | Status: SHIPPED | OUTPATIENT
Start: 2019-07-16 | End: 2020-02-19

## 2019-07-16 RX ORDER — HYDROCHLOROTHIAZIDE 25 MG/1
TABLET ORAL
Qty: 30 TAB | Refills: 6 | Status: SHIPPED | OUTPATIENT
Start: 2019-07-16 | End: 2020-03-18

## 2019-07-16 NOTE — PROGRESS NOTES
Patient here for a Hypertension Follow up. 1. Have you been to the ER, urgent care clinic since your last visit? Hospitalized since your last visit? No    2. Have you seen or consulted any other health care providers outside of the [address] [place] [address] since your last visit? Include any pap smears or colon screening.  No

## 2019-07-16 NOTE — PATIENT INSTRUCTIONS
Learning About Diuretics for High Blood Pressure Introduction Diuretics help to lower blood pressure. This reduces your risk of a heart attack and stroke. It also reduces your risk of kidney disease. Diuretics cause your kidneys to remove sodium and water. They also relax the blood vessel walls. These help lower your blood pressure. Examples · Chlorthalidone · Hydrochlorothiazide Possible side effects There are some common side effects. They are: · Too little potassium. · Feeling dizzy. · Rash. · Urinating a lot. · High blood sugar. (But this is not common.) You may have other side effects. Check the information that comes with your medicine. What to know about taking this medicine · You may take other medicines for blood pressure. Diuretics can help those work better. They can also prevent extra fluid in your body. · You may need to take potassium pills. Or you may have to watch how much potassium is in your food. Ask your doctor about this. · You may need blood tests to check your kidneys and your potassium level. · Take your medicines exactly as prescribed. Call your doctor if you think you are having a problem with your medicine. · Check with your doctor or pharmacist before you use any other medicines. This includes over-the-counter medicines. Make sure your doctor knows all of the medicines, vitamins, herbal products, and supplements you take. Taking some medicines together can cause problems. Where can you learn more? Go to http://vidya-aracdio.info/. Enter B419 in the search box to learn more about \"Learning About Diuretics for High Blood Pressure. \" Current as of: July 22, 2018 Content Version: 11.9 © 7695-0958 Healthwise, Incorporated. Care instructions adapted under license by [employer] (which disclaims liability or warranty for this information).  If you have questions about a medical condition or this instruction, always ask your healthcare professional. [place] [address] any warranty or liability for your use of this information.

## 2019-07-16 NOTE — PROGRESS NOTES
HISTORY OF PRESENT ILLNESS  Tiffany Powers is a 61 y.o. female. HPI  Patient is here today for evaluation and treatment of: /Hypertension    Hypertension:    Initial BP is elevated. Better at second check;l  Home BPs are stable. Has prediabetes; Has changed diet; Exercises. Last a1c was 6.0%    C/o tragus tenderness; Has been present for the last few days. No swimming;  No known excess water into the ear canal.      Current Outpatient Medications:     hydroCHLOROthiazide (HYDRODIURIL) 25 mg tablet, take 1 tablet by mouth once daily, Disp: 30 Tab, Rfl: 6    metoprolol succinate (TOPROL-XL) 100 mg tablet, take 1 tablet by mouth once daily, Disp: 30 Tab, Rfl: 6    multivitamin with minerals (HAIR,SKIN AND NAILS PO), Take  by mouth., Disp: , Rfl:     GLUCOSAM/CHOND/HYALU/CF BORATE ([place]. PO), Take  by mouth., Disp: , Rfl:     ethinyl estradiol-norethindron (JINTELI) 1-5 mg-mcg Tab, Take 1 Tab by mouth daily. , Disp: , Rfl:     psyllium seed-sucrose (METAMUCIL) Powd, Take  by mouth., Disp: , Rfl:     MULTI-VITAMIN PO, Take 1 Tab by mouth daily. , Disp: , Rfl:        PMH,  Meds, Allergies, Family History, Social history reviewed    Review of Systems   Constitutional: Negative for chills and fever. Cardiovascular: Negative for chest pain and palpitations.        Physical Exam   Visit Vitals  /82   Pulse 67   Temp 98 °F (36.7 °C) (Oral)   Resp 18   Ht 5' 9\" (1.753 m)   Wt 163 lb 3.2 oz (74 kg)   SpO2 100%   BMI 24.10 kg/m²     General appearance: alert, cooperative, no distress, appears stated age  Neck: supple, symmetrical, trachea midline, no adenopathy, thyroid: not enlarged, symmetric, no tenderness/mass/nodules, no carotid bruit and no JVD  Lungs: clear to auscultation bilaterally  Heart: regular rate and rhythm, S1, S2 normal, no murmur, click, rub or gallop  Extremities: extremities normal, atraumatic, no cyanosis or edema    Tm's clear; Left tragus is  slightly TTP    Lab Results   Component Value Date/Time    Cholesterol, total 149 07/03/2019 07:37 AM    HDL Cholesterol 40 07/03/2019 07:37 AM    LDL, calculated 90 07/03/2019 07:37 AM    VLDL, calculated 19 07/03/2019 07:37 AM    Triglyceride 95 07/03/2019 07:37 AM    CHOL/HDL Ratio 3.7 07/03/2019 07:37 AM     Lab Results   Component Value Date/Time    Sodium 141 07/03/2019 07:37 AM    Potassium 3.7 07/03/2019 07:37 AM    Chloride 105 07/03/2019 07:37 AM    CO2 30 07/03/2019 07:37 AM    Anion gap 6 07/03/2019 07:37 AM    Glucose 108 (H) 07/03/2019 07:37 AM    BUN 15 07/03/2019 07:37 AM    Creatinine 1.03 07/03/2019 07:37 AM    BUN/Creatinine ratio 15 07/03/2019 07:37 AM    GFR est AA >60 07/03/2019 07:37 AM    GFR est non-AA 54 (L) 07/03/2019 07:37 AM    Calcium 10.1 07/03/2019 07:37 AM     Lab Results   Component Value Date/Time    Hemoglobin A1c 6.3 (H) 07/03/2019 07:37 AM       ASSESSMENT and PLAN    ICD-10-CM ICD-9-CM    1. Essential hypertension I10 401.9 hydroCHLOROthiazide (HYDRODIURIL) 25 mg tablet      LIPID PANEL      METABOLIC PANEL, BASIC   2. Prediabetes R73.03 790.29 HEMOGLOBIN A1C WITH EAG         As above,   above all stable unless otherwise noted   treatment plan as listed below  Orders Placed This Encounter    LIPID PANEL    METABOLIC PANEL, BASIC    HEMOGLOBIN A1C WITH EAG    hydroCHLOROthiazide (HYDRODIURIL) 25 mg tablet    metoprolol succinate (TOPROL-XL) 100 mg tablet     Follow-up and Dispositions    · Return in about 6 months (around 1/16/2020) for WELL EXAM.     monitor ear; if worsens, notify MD  An After Visit Summary was printed and given to the patient. This has been fully explained to the patient, who indicates understanding.

## 2020-02-17 NOTE — PROGRESS NOTES
Pt accompanying visit with her brother. Stated she was unable to get her labs drawn due to order expiring. Requested a new order. Provider obliged.

## 2020-02-19 RX ORDER — METOPROLOL SUCCINATE 100 MG/1
TABLET, EXTENDED RELEASE ORAL
Qty: 30 TAB | Refills: 6 | Status: SHIPPED | OUTPATIENT
Start: 2020-02-19 | End: 2020-03-13

## 2020-03-11 ENCOUNTER — HOSPITAL ENCOUNTER (OUTPATIENT)
Dept: LAB | Age: 65
Discharge: HOME | End: 2020-03-11
Payer: COMMERCIAL

## 2020-03-11 DIAGNOSIS — I10 ESSENTIAL HYPERTENSION: ICD-10-CM

## 2020-03-11 DIAGNOSIS — R73.03 PREDIABETES: ICD-10-CM

## 2020-03-11 LAB
ANION GAP SERPL CALC-SCNC: 6 MMOL/L (ref 3–18)
BUN SERPL-MCNC: 16 MG/DL (ref 7–18)
BUN/CREAT SERPL: 16 (ref 12–20)
CALCIUM SERPL-MCNC: 9.5 MG/DL (ref 8.5–10.1)
CHLORIDE SERPL-SCNC: 108 MMOL/L (ref 100–111)
CHOLEST SERPL-MCNC: 157 MG/DL
CO2 SERPL-SCNC: 29 MMOL/L (ref 21–32)
CREAT SERPL-MCNC: 1.03 MG/DL (ref 0.6–1.3)
EST. AVERAGE GLUCOSE BLD GHB EST-MCNC: 128 MG/DL
GLUCOSE SERPL-MCNC: 100 MG/DL (ref 74–99)
HBA1C MFR BLD: 6.1 % (ref 4.2–5.6)
HDLC SERPL-MCNC: 36 MG/DL (ref 40–60)
HDLC SERPL: 4.4 {RATIO} (ref 0–5)
LDLC SERPL CALC-MCNC: 102 MG/DL (ref 0–100)
LIPID PROFILE,FLP: ABNORMAL
POTASSIUM SERPL-SCNC: 4 MMOL/L (ref 3.5–5.5)
SODIUM SERPL-SCNC: 143 MMOL/L (ref 136–145)
TRIGL SERPL-MCNC: 95 MG/DL (ref ?–150)
VLDLC SERPL CALC-MCNC: 19 MG/DL

## 2020-03-11 PROCEDURE — 83036 HEMOGLOBIN GLYCOSYLATED A1C: CPT

## 2020-03-11 PROCEDURE — 36415 COLL VENOUS BLD VENIPUNCTURE: CPT

## 2020-03-11 PROCEDURE — 80061 LIPID PANEL: CPT

## 2020-03-11 PROCEDURE — 80048 BASIC METABOLIC PNL TOTAL CA: CPT

## 2020-03-13 RX ORDER — METOPROLOL SUCCINATE 100 MG/1
TABLET, EXTENDED RELEASE ORAL
Qty: 30 TAB | Refills: 6 | Status: SHIPPED | OUTPATIENT
Start: 2020-03-13 | End: 2020-03-31 | Stop reason: SDUPTHER

## 2020-03-15 DIAGNOSIS — I10 ESSENTIAL HYPERTENSION: ICD-10-CM

## 2020-03-18 RX ORDER — HYDROCHLOROTHIAZIDE 25 MG/1
TABLET ORAL
Qty: 210 TAB | Refills: 1 | Status: SHIPPED | OUTPATIENT
Start: 2020-03-18 | End: 2020-03-31 | Stop reason: SDUPTHER

## 2020-03-30 ENCOUNTER — TELEPHONE (OUTPATIENT)
Dept: FAMILY MEDICINE CLINIC | Age: 65
End: 2020-03-30

## 2020-03-30 NOTE — TELEPHONE ENCOUNTER
Patient was called and screened with the following questions:  1) Have you or anyone in your household traveled in the past 30 days ? 2) Have you or anyone in your household had any of the following symptoms in the past 14 days: Fever, Cough, or  SOB? 3) Have you or anyone in your house hold been around anyone suspected of Covid 19? Patient denied all screening questions and verified appointment.

## 2020-03-31 ENCOUNTER — OFFICE VISIT (OUTPATIENT)
Dept: FAMILY MEDICINE CLINIC | Age: 65
End: 2020-03-31

## 2020-03-31 ENCOUNTER — TELEPHONE (OUTPATIENT)
Dept: FAMILY MEDICINE CLINIC | Age: 65
End: 2020-03-31

## 2020-03-31 VITALS
WEIGHT: 167 LBS | DIASTOLIC BLOOD PRESSURE: 70 MMHG | TEMPERATURE: 98.1 F | RESPIRATION RATE: 16 BRPM | SYSTOLIC BLOOD PRESSURE: 150 MMHG | HEIGHT: 69 IN | OXYGEN SATURATION: 99 % | BODY MASS INDEX: 24.73 KG/M2 | HEART RATE: 65 BPM

## 2020-03-31 DIAGNOSIS — Z00.00 WELL WOMAN EXAM (NO GYNECOLOGICAL EXAM): Primary | ICD-10-CM

## 2020-03-31 DIAGNOSIS — I10 ESSENTIAL HYPERTENSION: ICD-10-CM

## 2020-03-31 RX ORDER — HYDROCHLOROTHIAZIDE 25 MG/1
25 TABLET ORAL DAILY
Qty: 30 TAB | Refills: 6 | Status: SHIPPED | OUTPATIENT
Start: 2020-03-31 | End: 2020-11-09 | Stop reason: SDUPTHER

## 2020-03-31 RX ORDER — METOPROLOL SUCCINATE 100 MG/1
100 TABLET, EXTENDED RELEASE ORAL DAILY
Qty: 30 TAB | Refills: 6 | Status: SHIPPED | OUTPATIENT
Start: 2020-03-31 | End: 2020-11-09 | Stop reason: SDUPTHER

## 2020-03-31 NOTE — PROGRESS NOTES
1. Have you been to the ER, urgent care clinic since your last visit? Hospitalized since your last visit? No    2. Have you seen or consulted any other health care providers outside of the [address] [place] [address] since your last visit? Include any pap smears or colon screening.  No

## 2020-03-31 NOTE — PATIENT INSTRUCTIONS
Well Visit, Women 48 to 72: Care Instructions  Your Care Instructions    Physical exams can help you stay healthy. Your doctor has checked your overall health and may have suggested ways to take good care of yourself. He or she also may have recommended tests. At home, you can help prevent illness with healthy eating, regular exercise, and other steps. Follow-up care is a key part of your treatment and safety. Be sure to make and go to all appointments, and call your doctor if you are having problems. It's also a good idea to know your test results and keep a list of the medicines you take. How can you care for yourself at home? · Reach and stay at a healthy weight. This will lower your risk for many problems, such as obesity, diabetes, heart disease, and high blood pressure. · Get at least 30 minutes of exercise on most days of the week. Walking is a good choice. You also may want to do other activities, such as running, swimming, cycling, or playing tennis or team sports. · Do not smoke. Smoking can make health problems worse. If you need help quitting, talk to your doctor about stop-smoking programs and medicines. These can increase your chances of quitting for good. · Protect your skin from too much sun. When you're outdoors from 10 a.m. to 4 p.m., stay in the shade or cover up with clothing and a hat with a wide brim. Wear sunglasses that block UV rays. Even when it's cloudy, put broad-spectrum sunscreen (SPF 30 or higher) on any exposed skin. · See a dentist one or two times a year for checkups and to have your teeth cleaned. · Wear a seat belt in the car. Follow your doctor's advice about when to have certain tests. These tests can spot problems early. · Cholesterol. Your doctor will tell you how often to have this done based on your age, family history, or other things that can increase your risk for heart attack and stroke. · Blood pressure.  Have your blood pressure checked during a routine doctor visit. Your doctor will tell you how often to check your blood pressure based on your age, your blood pressure results, and other factors. · Mammogram. Ask your doctor how often you should have a mammogram, which is an X-ray of your breasts. A mammogram can spot breast cancer before it can be felt and when it is easiest to treat. · Pap test and pelvic exam. Ask your doctor how often you should have a Pap test. You may not need to have a Pap test as often as you used to. · Vision. Have your eyes checked every year or two or as often as your doctor suggests. Some experts recommend that you have yearly exams for glaucoma and other age-related eye problems starting at age 48. · Hearing. Tell your doctor if you notice any change in your hearing. You can have tests to find out how well you hear. · Diabetes. Ask your doctor whether you should have tests for diabetes. · Colorectal cancer. Your risk for colorectal cancer gets higher as you get older. Some experts say that adults should start regular screening at age 48 and stop at age 76. Others say to start before age 48 or continue after age 76. Talk with your doctor about your risk and when to start and stop screening. · Thyroid disease. Talk to your doctor about whether to have your thyroid checked as part of a regular physical exam. Women have an increased chance of a thyroid problem. · Osteoporosis. You should begin tests for bone density at age 72. If you are younger than 72, ask your doctor whether you have factors that may increase your risk for this disease. You may want to have this test before age 72. · Heart attack and stroke risk. At least every 4 to 6 years, you should have your risk for heart attack and stroke assessed. Your doctor uses factors such as your age, blood pressure, cholesterol, and whether you smoke or have diabetes to show what your risk for a heart attack or stroke is over the next 10 years.   When should you call for help?  Watch closely for changes in your health, and be sure to contact your doctor if you have any problems or symptoms that concern you. Where can you learn more? Go to http://vidya-arcadio.info/  Enter [id number] in the search box to learn more about \"Well Visit, Women 50 to 72: Care Instructions. \"  Current as of: August 21, 2019Content Version: 12.4  © 3644-0625 Healthwise, Incorporated. Care instructions adapted under license by [employer] (which disclaims liability or warranty for this information). If you have questions about a medical condition or this instruction, always ask your healthcare professional. [address] any warranty or liability for your use of this information.

## 2020-06-11 LAB — MAMMOGRAPHY, EXTERNAL: NORMAL

## 2020-11-09 ENCOUNTER — OFFICE VISIT (OUTPATIENT)
Dept: FAMILY MEDICINE CLINIC | Age: 65
End: 2020-11-09
Payer: COMMERCIAL

## 2020-11-09 VITALS
TEMPERATURE: 97.5 F | HEART RATE: 65 BPM | WEIGHT: 168 LBS | DIASTOLIC BLOOD PRESSURE: 70 MMHG | OXYGEN SATURATION: 97 % | SYSTOLIC BLOOD PRESSURE: 157 MMHG | RESPIRATION RATE: 18 BRPM | HEIGHT: 69 IN | BODY MASS INDEX: 24.88 KG/M2

## 2020-11-09 DIAGNOSIS — I10 ESSENTIAL HYPERTENSION: Primary | ICD-10-CM

## 2020-11-09 DIAGNOSIS — Z78.0 ASYMPTOMATIC MENOPAUSAL STATE: ICD-10-CM

## 2020-11-09 DIAGNOSIS — R73.03 PREDIABETES: ICD-10-CM

## 2020-11-09 PROCEDURE — 99214 OFFICE O/P EST MOD 30 MIN: CPT | Performed by: FAMILY MEDICINE

## 2020-11-09 RX ORDER — METOPROLOL SUCCINATE 100 MG/1
100 TABLET, EXTENDED RELEASE ORAL DAILY
Qty: 30 TAB | Refills: 6 | Status: SHIPPED | OUTPATIENT
Start: 2020-11-09 | End: 2021-06-23 | Stop reason: DRUGHIGH

## 2020-11-09 RX ORDER — HYDROCHLOROTHIAZIDE 25 MG/1
25 TABLET ORAL DAILY
Qty: 30 TAB | Refills: 6 | Status: SHIPPED | OUTPATIENT
Start: 2020-11-09 | End: 2021-10-05

## 2020-11-09 NOTE — PROGRESS NOTES
Lawrance Smoke presents today for No chief complaint on file. Is someone accompanying this pt? no    Is the patient using any DME equipment during [address]? no    Depression Screening:  3 most recent PHQ Screens 11/9/2020   Little interest or pleasure in doing things Not at all   Feeling down, depressed, irritable, or hopeless Not at all   Total Score PHQ 2 0       Learning Assessment:  Learning Assessment 7/29/2015   PRIMARY LEARNER Patient   HIGHEST LEVEL OF EDUCATION - PRIMARY LEARNER  -   BARRIERS PRIMARY LEARNER -   CO-LEARNER CAREGIVER -   PRIMARY LANGUAGE ENGLISH    NEED -   LEARNER PREFERENCE PRIMARY LISTENING     READING     DEMONSTRATION   LEARNING SPECIAL TOPICS -   ANSWERED BY patient    RELATIONSHIP SELF       Abuse Screening:  Abuse Screening Questionnaire 2/14/2018   Do you ever feel afraid of your partner? N   Are you in a relationship with someone who physically or mentally threatens you? N   Is it safe for you to go home? Y       Fall Risk  No flowsheet data found. ADL  ADL Assessment 1/29/2015   Feeding yourself No Help Needed   Getting from bed to chair No Help Needed   Getting dressed No Help Needed   Bathing or showering No Help Needed   Walk across the room (includes cane/walker) No Help Needed   Using the telphone No Help Needed   Taking your medications No Help Needed   Preparing meals No Help Needed   Managing money (expenses/bills) No Help Needed   Moderately strenuous housework (laundry) No Help Needed   Shopping for personal items (toiletries/medicines) No Help Needed   Shopping for groceries No Help Needed   Driving No Help Needed   Climbing a flight of stairs No Help Needed   Getting to places beyond walking distances No Help Needed       Health Maintenance reviewed and discussed and ordered per Provider.     Health Maintenance Due   Topic Date Due    Shingrix Vaccine Age 49> (1 of 2) 11/07/2005    Flu Vaccine (1) 09/01/2020    GLAUCOMA SCREENING Q2Y 11/07/2020    Bone Densitometry (Dexa) Screening  11/07/2020    Pneumococcal 65+ years (1 of 1 - PPSV23) 11/07/2020   . Coordination of Care:  1. Have you been to the ER, urgent care clinic since your last visit? noHospitalized since your last visit? no    2. Have you seen or consulted any other health care providers outside of the [address] [place] [address] since your last visit? Include any pap smears or colon screening. no      Patient states that she already got  the flu shot.

## 2020-11-09 NOTE — PATIENT INSTRUCTIONS
DASH Diet: Care Instructions Your Care Instructions The DASH diet is an eating plan that can help lower your blood pressure. DASH stands for Dietary Approaches to Stop Hypertension. Hypertension is high blood pressure. The DASH diet focuses on eating foods that are high in calcium, potassium, and magnesium. These nutrients can lower blood pressure. The foods that are highest in these nutrients are fruits, vegetables, low-fat dairy products, nuts, seeds, and legumes. But taking calcium, potassium, and magnesium supplements instead of eating foods that are high in those nutrients does not have the same effect. The DASH diet also includes whole grains, fish, and poultry. The DASH diet is one of several lifestyle changes your doctor may recommend to lower your high blood pressure. Your doctor may also want you to decrease the amount of sodium in your diet. Lowering sodium while following the DASH diet can lower blood pressure even further than just the DASH diet alone. Follow-up care is a key part of your treatment and safety. Be sure to make and go to all appointments, and call your doctor if you are having problems. It's also a good idea to know your test results and keep a list of the medicines you take. How can you care for yourself at home? Following the DASH diet · Eat 4 to 5 servings of fruit each day. A serving is 1 medium-sized piece of fruit, ½ cup chopped or canned fruit, 1/4 cup dried fruit, or 4 ounces (½ cup) of fruit juice. Choose fruit more often than fruit juice. · Eat 4 to 5 servings of vegetables each day. A serving is 1 cup of lettuce or raw leafy vegetables, ½ cup of chopped or cooked vegetables, or 4 ounces (½ cup) of vegetable juice. Choose vegetables more often than vegetable juice. · Get 2 to 3 servings of low-fat and fat-free dairy each day. A serving is 8 ounces of milk, 1 cup of yogurt, or 1 ½ ounces of cheese. · Eat 6 to 8 servings of grains each day. A serving is 1 slice of bread, 1 ounce of dry cereal, or ½ cup of cooked rice, pasta, or cooked cereal. Try to choose whole-grain products as much as possible. · Limit lean meat, poultry, and fish to 2 servings each day. A serving is 3 ounces, about the size of a deck of cards. · Eat 4 to 5 servings of nuts, seeds, and legumes (cooked dried beans, lentils, and split peas) each week. A serving is 1/3 cup of nuts, 2 tablespoons of seeds, or ½ cup of cooked beans or peas. · Limit fats and oils to 2 to 3 servings each day. A serving is 1 teaspoon of vegetable oil or 2 tablespoons of salad dressing. · Limit sweets and added sugars to 5 servings or less a week. A serving is 1 tablespoon jelly or jam, ½ cup sorbet, or 1 cup of lemonade. · Eat less than 2,300 milligrams (mg) of sodium a day. If you limit your sodium to 1,500 mg a day, you can lower your blood pressure even more. Tips for success · Start small. Do not try to make dramatic changes to your diet all at once. You might feel that you are missing out on your favorite foods and then be more likely to not follow the plan. Make small changes, and stick with them. Once those changes become habit, add a few more changes. · Try some of the following: ? Make it a goal to eat a fruit or vegetable at every meal and at snacks. This will make it easy to get the recommended amount of fruits and vegetables each day. ? Try yogurt topped with fruit and nuts for a snack or healthy dessert. ? Add lettuce, tomato, cucumber, and onion to sandwiches. ? Combine a ready-made pizza crust with low-fat mozzarella cheese and lots of vegetable toppings. Try using tomatoes, squash, spinach, broccoli, carrots, cauliflower, and onions. ? Have a variety of cut-up vegetables with a low-fat dip as an appetizer instead of chips and dip. ? Sprinkle sunflower seeds or chopped almonds over salads.  Or try adding chopped walnuts or almonds to cooked vegetables. ? Try some vegetarian meals using beans and peas. Add garbanzo or kidney beans to salads. Make burritos and tacos with mashed narayanan beans or black beans. Where can you learn more? Go to http://www.gray.com/ Enter L211 in the search box to learn more about \"DASH Diet: Care Instructions. \" Current as of: December 16, 2019               Content Version: 12.6 © 9253-5110 [employer]. Care instructions adapted under license by [employer] (which disclaims liability or warranty for this information). If you have questions about a medical condition or this instruction, always ask your healthcare professional. [address] any warranty or liability for your use of this information.

## 2020-11-09 NOTE — PROGRESS NOTES
HPI:  Siria Barrios is a 72 y.o. female who presents today with   Chief Complaint   Patient presents with    High Blood Sugar     Prediabetes    Hypertension     f/u      HTN: BPs at home have been in the 204D systolic; and 49T diastolic. BP is elevated today. She has been feeling well;     Pt has prediabetes; she needs follow up on labs; she attempts a lower sugar, lower carb diet. She stays active      BP Readings from Last 3 Encounters:   11/09/20 (!) 157/70   03/31/20 150/70   07/16/19 140/82           3 most recent PHQ Screens 11/9/2020   Little interest or pleasure in doing things Not at all   Feeling down, depressed, irritable, or hopeless Not at all   Total Score PHQ 2 0             PMH,  Meds, Allergies, Family History, Social history reviewed      Current Outpatient Medications   Medication Sig Dispense Refill    hydroCHLOROthiazide (HYDRODIURIL) 25 mg tablet Take 1 Tab by mouth daily. 30 Tab 6    metoprolol succinate (TOPROL-XL) 100 mg tablet Take 1 Tab by mouth daily. 30 Tab 6    multivitamin with minerals (HAIR,SKIN AND NAILS PO) Take  by mouth.  GLUCOSAM/CHOND/HYALU/CF BORATE (MOVE FREE JOINT HEALTH PO) Take  by mouth.  ethinyl estradiol-norethindron (JINTELI) 1-5 mg-mcg Tab Take 1 Tab by mouth daily.  psyllium seed-sucrose (METAMUCIL) Powd Take  by mouth.  MULTI-VITAMIN PO Take 1 Tab by mouth daily. Allergies   Allergen Reactions    Codeine Nausea and Vomiting     Violent vomiting    Ace Inhibitors Cough    Clonidine Other (comments)     Visual disturbance    Hyzaar [Losartan-Hydrochlorothiazide] Other (comments)     Palpitations. Able to tolerate HCTZ                  Review of Systems   Constitutional: Negative for chills and fever. Respiratory: Negative for shortness of breath and wheezing. Cardiovascular: Negative for chest pain and palpitations. All other systems reviewed and are negative.         Visit Vitals  BP (!) 157/70   Pulse 65 Temp 97.5 °F (36.4 °C) (Temporal)   Resp 18   Ht 5' 9\" (1.753 m)   Wt 168 lb (76.2 kg)   SpO2 97%   BMI 24.81 kg/m²     Physical Exam   Visit Vitals  BP (!) 157/70   Pulse 65   Temp 97.5 °F (36.4 °C) (Temporal)   Resp 18   Ht 5' 9\" (1.753 m)   Wt 168 lb (76.2 kg)   SpO2 97%   BMI 24.81 kg/m²     General appearance: alert, cooperative, no distress, appears stated age  Neck: supple, symmetrical, trachea midline, no adenopathy, thyroid: not enlarged, symmetric, no tenderness/mass/nodules, no carotid bruit and no JVD  Lungs: clear to auscultation bilaterally  Heart: regular rate and rhythm, S1, S2 normal, no murmur, click, rub or gallop    Extremities: extremities normal, atraumatic, no cyanosis or edema      Assessment/Plan:  Diagnoses and all orders for this visit:    1. Essential hypertension  -     hydroCHLOROthiazide (HYDRODIURIL) 25 mg tablet; Take 1 Tab by mouth daily.  -     LIPID PANEL; Future  -     METABOLIC PANEL, COMPREHENSIVE; Future    2. Prediabetes  -     HEMOGLOBIN A1C WITH EAG; Future    3. Asymptomatic menopausal state  -     DEXA BONE DENSITY STUDY AXIAL; Future    Other orders  -     metoprolol succinate (TOPROL-XL) 100 mg tablet; Take 1 Tab by mouth daily. - hydroCHLOROthiazide (HYDRODIURIL) 25 mg tablet; Take 1 Tab by mouth daily. Dispense: 30 Tab; Refill: 6       Follow-up and Dispositions    · Return in about 3 months (around 2/9/2021), or bp. Follow-up and Dispositions    · Return in about 3 months (around 2/9/2021), or bp. This has been fully explained to the patient, who indicates understanding. An After Visit Summary was printed and given to the patient.         Mattie Khan MD

## 2021-03-27 ENCOUNTER — HOSPITAL ENCOUNTER (OUTPATIENT)
Dept: LAB | Age: 66
Discharge: HOME | End: 2021-03-27
Payer: COMMERCIAL

## 2021-03-27 DIAGNOSIS — R73.03 PREDIABETES: ICD-10-CM

## 2021-03-27 DIAGNOSIS — I10 ESSENTIAL HYPERTENSION: ICD-10-CM

## 2021-03-27 LAB
ALBUMIN SERPL-MCNC: 4.1 G/DL (ref 3.4–5)
ALBUMIN/GLOB SERPL: 1.2 {RATIO} (ref 0.8–1.7)
ALP SERPL-CCNC: 70 U/L (ref 45–117)
ALT SERPL-CCNC: 28 U/L (ref 13–56)
ANION GAP SERPL CALC-SCNC: 4 MMOL/L (ref 3–18)
AST SERPL-CCNC: 15 U/L (ref 10–38)
BILIRUB SERPL-MCNC: 0.5 MG/DL (ref 0.2–1)
BUN SERPL-MCNC: 13 MG/DL (ref 7–18)
BUN/CREAT SERPL: 13 (ref 12–20)
CALCIUM SERPL-MCNC: 9.6 MG/DL (ref 8.5–10.1)
CHLORIDE SERPL-SCNC: 106 MMOL/L (ref 100–111)
CHOLEST SERPL-MCNC: 173 MG/DL
CO2 SERPL-SCNC: 32 MMOL/L (ref 21–32)
CREAT SERPL-MCNC: 1.01 MG/DL (ref 0.6–1.3)
EST. AVERAGE GLUCOSE BLD GHB EST-MCNC: 131 MG/DL
GLOBULIN SER CALC-MCNC: 3.3 G/DL (ref 2–4)
GLUCOSE SERPL-MCNC: 103 MG/DL (ref 74–99)
HBA1C MFR BLD: 6.2 % (ref 4.2–5.6)
HDLC SERPL-MCNC: 45 MG/DL (ref 40–60)
HDLC SERPL: 3.8 {RATIO} (ref 0–5)
LDLC SERPL CALC-MCNC: 109.8 MG/DL (ref 0–100)
LIPID PROFILE,FLP: ABNORMAL
POTASSIUM SERPL-SCNC: 3.8 MMOL/L (ref 3.5–5.5)
PROT SERPL-MCNC: 7.4 G/DL (ref 6.4–8.2)
SODIUM SERPL-SCNC: 142 MMOL/L (ref 136–145)
TRIGL SERPL-MCNC: 91 MG/DL (ref ?–150)
VLDLC SERPL CALC-MCNC: 18.2 MG/DL

## 2021-03-27 PROCEDURE — 83036 HEMOGLOBIN GLYCOSYLATED A1C: CPT

## 2021-03-27 PROCEDURE — 36415 COLL VENOUS BLD VENIPUNCTURE: CPT

## 2021-03-27 PROCEDURE — 80053 COMPREHEN METABOLIC PANEL: CPT

## 2021-03-27 PROCEDURE — 80061 LIPID PANEL: CPT

## 2021-04-13 ENCOUNTER — OFFICE VISIT (OUTPATIENT)
Dept: FAMILY MEDICINE CLINIC | Age: 66
End: 2021-04-13
Payer: COMMERCIAL

## 2021-04-13 VITALS
WEIGHT: 164 LBS | RESPIRATION RATE: 18 BRPM | BODY MASS INDEX: 24.29 KG/M2 | SYSTOLIC BLOOD PRESSURE: 174 MMHG | HEIGHT: 69 IN | OXYGEN SATURATION: 94 % | DIASTOLIC BLOOD PRESSURE: 75 MMHG

## 2021-04-13 DIAGNOSIS — I10 ESSENTIAL HYPERTENSION: Primary | ICD-10-CM

## 2021-04-13 PROCEDURE — G8400 PT W/DXA NO RESULTS DOC: HCPCS | Performed by: FAMILY MEDICINE

## 2021-04-13 PROCEDURE — G8754 DIAS BP LESS 90: HCPCS | Performed by: FAMILY MEDICINE

## 2021-04-13 PROCEDURE — G8536 NO DOC ELDER MAL SCRN: HCPCS | Performed by: FAMILY MEDICINE

## 2021-04-13 PROCEDURE — G8420 CALC BMI NORM PARAMETERS: HCPCS | Performed by: FAMILY MEDICINE

## 2021-04-13 PROCEDURE — 99214 OFFICE O/P EST MOD 30 MIN: CPT | Performed by: FAMILY MEDICINE

## 2021-04-13 PROCEDURE — G8510 SCR DEP NEG, NO PLAN REQD: HCPCS | Performed by: FAMILY MEDICINE

## 2021-04-13 PROCEDURE — 1101F PT FALLS ASSESS-DOCD LE1/YR: CPT | Performed by: FAMILY MEDICINE

## 2021-04-13 PROCEDURE — 3017F COLORECTAL CA SCREEN DOC REV: CPT | Performed by: FAMILY MEDICINE

## 2021-04-13 PROCEDURE — G8753 SYS BP > OR = 140: HCPCS | Performed by: FAMILY MEDICINE

## 2021-04-13 PROCEDURE — G8427 DOCREV CUR MEDS BY ELIG CLIN: HCPCS | Performed by: FAMILY MEDICINE

## 2021-04-13 PROCEDURE — 1090F PRES/ABSN URINE INCON ASSESS: CPT | Performed by: FAMILY MEDICINE

## 2021-04-13 PROCEDURE — G9899 SCRN MAM PERF RSLTS DOC: HCPCS | Performed by: FAMILY MEDICINE

## 2021-04-13 RX ORDER — METOPROLOL SUCCINATE 50 MG/1
50 TABLET, EXTENDED RELEASE ORAL DAILY
Qty: 90 TAB | Refills: 3 | Status: SHIPPED | OUTPATIENT
Start: 2021-04-13 | End: 2021-06-23 | Stop reason: DRUGHIGH

## 2021-04-13 NOTE — PROGRESS NOTES
Jennifer Terry presents today for No chief complaint on file. Is someone accompanying this pt? Yes Brother    Is the patient using any DME equipment during OV? NO    Depression Screening:  3 most recent PHQ Screens 4/13/2021   Little interest or pleasure in doing things Not at all   Feeling down, depressed, irritable, or hopeless Not at all   Total Score PHQ 2 0       Learning Assessment:  Learning Assessment 7/29/2015   PRIMARY LEARNER Patient   HIGHEST LEVEL OF EDUCATION - PRIMARY LEARNER  -   BARRIERS PRIMARY LEARNER -   CO-LEARNER CAREGIVER -   PRIMARY LANGUAGE ENGLISH    NEED -   LEARNER PREFERENCE PRIMARY LISTENING     READING     DEMONSTRATION   LEARNING SPECIAL TOPICS -   ANSWERED BY patient    RELATIONSHIP SELF       Abuse Screening:  Abuse Screening Questionnaire 4/13/2021   Do you ever feel afraid of your partner? N   Are you in a relationship with someone who physically or mentally threatens you? N   Is it safe for you to go home? Y       Fall Risk  Fall Risk Assessment, last 12 mths 4/13/2021   Able to walk? Yes   Fall in past 12 months? 0   Do you feel unsteady? 0   Are you worried about falling 0       ADL  ADL Assessment 1/29/2015   Feeding yourself No Help Needed   Getting from bed to chair No Help Needed   Getting dressed No Help Needed   Bathing or showering No Help Needed   Walk across the room (includes cane/walker) No Help Needed   Using the telphone No Help Needed   Taking your medications No Help Needed   Preparing meals No Help Needed   Managing money (expenses/bills) No Help Needed   Moderately strenuous housework (laundry) No Help Needed   Shopping for personal items (toiletries/medicines) No Help Needed   Shopping for groceries No Help Needed   Driving No Help Needed   Climbing a flight of stairs No Help Needed   Getting to places beyond walking distances No Help Needed       Health Maintenance reviewed and discussed and ordered per Provider.     Health Maintenance Due   Topic Date Due    COVID-19 Vaccine (1) Never done    Shingrix Vaccine Age 50> (1 of 2) Never done    Bone Densitometry (Dexa) Screening  Never done    Pneumococcal 65+ years (1 of 1 - PPSV23) Never done   . Coordination of Car  1. Have you been to the ER, urgent care clinic since your last visit? NO Hospitalized since your last visit? NO    2. Have you seen or consulted any other health care providers outside of the [address] [place] [address] since your last visit? NO Include any pap smears or colon screening.  NO

## 2021-04-13 NOTE — PROGRESS NOTES
HPI:  Génesis Grant is a 72 y.o. female who presents today with   Chief Complaint   Patient presents with    Hypertension        Home BPs are usually stable; when she gets upset BP elevates. Blood pressure is noted to be elevated today. Blood pressure is about the same at second check. She physically feels well  She has no other complaints  She is on medications as listed below she is compliant with her medications. 3 most recent PHQ Screens 4/13/2021   Little interest or pleasure in doing things Not at all   Feeling down, depressed, irritable, or hopeless Not at all   Total Score PHQ 2 0               PMH,  Meds, Allergies, Family History, Social history reviewed      Current Outpatient Medications   Medication Sig Dispense Refill    hydroCHLOROthiazide (HYDRODIURIL) 25 mg tablet Take 1 Tab by mouth daily. 30 Tab 6    metoprolol succinate (TOPROL-XL) 100 mg tablet Take 1 Tab by mouth daily. 30 Tab 6    multivitamin with minerals (HAIR,SKIN AND NAILS PO) Take  by mouth.  GLUCOSAM/CHOND/HYALU/CF BORATE (MOVE FREE JOINT HEALTH PO) Take  by mouth.  ethinyl estradiol-norethindron (JINTELI) 1-5 mg-mcg Tab Take 1 Tab by mouth daily.  psyllium seed-sucrose (METAMUCIL) Powd Take  by mouth.  MULTI-VITAMIN PO Take 1 Tab by mouth daily. Allergies   Allergen Reactions    Codeine Nausea and Vomiting     Violent vomiting    Ace Inhibitors Cough    Clonidine Other (comments)     Visual disturbance    Hyzaar [Losartan-Hydrochlorothiazide] Other (comments)     Palpitations.  Able to tolerate HCTZ                  ROS   As per HPI      Visit Vitals  BP (!) 174/75   Resp 18   Ht 5' 9\" (1.753 m)   Wt 164 lb (74.4 kg)   SpO2 94%   BMI 24.22 kg/m²     Physical Exam   General appearance: alert, cooperative, no distress, appears stated age  Neck: supple, symmetrical, trachea midline, no adenopathy, thyroid: not enlarged, symmetric, no tenderness/mass/nodules, no carotid bruit and no JVD  Lungs: clear to auscultation bilaterally  Heart: regular rate and rhythm, S1, S2 normal, no murmur, click, rub or gallop  Extremities: extremities normal, atraumatic, no cyanosis or edema    . Lab Results   Component Value Date/Time    Cholesterol, total 173 03/27/2021 08:44 AM    HDL Cholesterol 45 03/27/2021 08:44 AM    LDL, calculated 109.8 (H) 03/27/2021 08:44 AM    VLDL, calculated 18.2 03/27/2021 08:44 AM    Triglyceride 91 03/27/2021 08:44 AM    CHOL/HDL Ratio 3.8 03/27/2021 08:44 AM     Lab Results   Component Value Date/Time    Sodium 142 03/27/2021 08:44 AM    Potassium 3.8 03/27/2021 08:44 AM    Chloride 106 03/27/2021 08:44 AM    CO2 32 03/27/2021 08:44 AM    Anion gap 4 03/27/2021 08:44 AM    Glucose 103 (H) 03/27/2021 08:44 AM    BUN 13 03/27/2021 08:44 AM    Creatinine 1.01 03/27/2021 08:44 AM    BUN/Creatinine ratio 13 03/27/2021 08:44 AM    GFR est AA >60 03/27/2021 08:44 AM    GFR est non-AA 55 (L) 03/27/2021 08:44 AM    Calcium 9.6 03/27/2021 08:44 AM         Assessment/Plan:    Diagnoses and all orders for this visit:    1. Essential hypertension  -     LIPID PANEL; Future  -     METABOLIC PANEL, COMPREHENSIVE; Future    Other orders  -     metoprolol succinate (TOPROL-XL) 50 mg XL tablet; Take 1 Tab by mouth daily. As above. Pressure not controlled. Increase Toprol to 50 mg once daily ;medications as ordered. Continue dietary control measures, lifestyle changes  Follow-up and Dispositions    · Return in about 6 months (around 10/13/2021) for well exam.       This has been fully explained to the patient, who indicates understanding. An After Visit Summary was printed and given to the patient. Follow-up in 8 weeks for blood pressure check.          Tyra Becerra MD

## 2021-04-13 NOTE — PATIENT INSTRUCTIONS
DASH Diet: Care Instructions Your Care Instructions The DASH diet is an eating plan that can help lower your blood pressure. DASH stands for Dietary Approaches to Stop Hypertension. Hypertension is high blood pressure. The DASH diet focuses on eating foods that are high in calcium, potassium, and magnesium. These nutrients can lower blood pressure. The foods that are highest in these nutrients are fruits, vegetables, low-fat dairy products, nuts, seeds, and legumes. But taking calcium, potassium, and magnesium supplements instead of eating foods that are high in those nutrients does not have the same effect. The DASH diet also includes whole grains, fish, and poultry. The DASH diet is one of several lifestyle changes your doctor may recommend to lower your high blood pressure. Your doctor may also want you to decrease the amount of sodium in your diet. Lowering sodium while following the DASH diet can lower blood pressure even further than just the DASH diet alone. Follow-up care is a key part of your treatment and safety. Be sure to make and go to all appointments, and call your doctor if you are having problems. It's also a good idea to know your test results and keep a list of the medicines you take. How can you care for yourself at home? Following the DASH diet · Eat 4 to 5 servings of fruit each day. A serving is 1 medium-sized piece of fruit, ½ cup chopped or canned fruit, 1/4 cup dried fruit, or 4 ounces (½ cup) of fruit juice. Choose fruit more often than fruit juice. · Eat 4 to 5 servings of vegetables each day. A serving is 1 cup of lettuce or raw leafy vegetables, ½ cup of chopped or cooked vegetables, or 4 ounces (½ cup) of vegetable juice. Choose vegetables more often than vegetable juice. · Get 2 to 3 servings of low-fat and fat-free dairy each day. A serving is 8 ounces of milk, 1 cup of yogurt, or 1 ½ ounces of cheese. · Eat 6 to 8 servings of grains each day.  A serving is 1 slice of bread, 1 ounce of dry cereal, or ½ cup of cooked rice, pasta, or cooked cereal. Try to choose whole-grain products as much as possible. · Limit lean meat, poultry, and fish to 2 servings each day. A serving is 3 ounces, about the size of a deck of cards. · Eat 4 to 5 servings of nuts, seeds, and legumes (cooked dried beans, lentils, and split peas) each week. A serving is 1/3 cup of nuts, 2 tablespoons of seeds, or ½ cup of cooked beans or peas. · Limit fats and oils to 2 to 3 servings each day. A serving is 1 teaspoon of vegetable oil or 2 tablespoons of salad dressing. · Limit sweets and added sugars to 5 servings or less a week. A serving is 1 tablespoon jelly or jam, ½ cup sorbet, or 1 cup of lemonade. · Eat less than 2,300 milligrams (mg) of sodium a day. If you limit your sodium to 1,500 mg a day, you can lower your blood pressure even more. · Be aware that all of these are the suggested number of servings for people who eat 1,800 to 2,000 calories a day. Your recommended number of servings may be different if you need more or fewer calories. Tips for success · Start small. Do not try to make dramatic changes to your diet all at once. You might feel that you are missing out on your favorite foods and then be more likely to not follow the plan. Make small changes, and stick with them. Once those changes become habit, add a few more changes. · Try some of the following: ? Make it a goal to eat a fruit or vegetable at every meal and at snacks. This will make it easy to get the recommended amount of fruits and vegetables each day. ? Try yogurt topped with fruit and nuts for a snack or healthy dessert. ? Add lettuce, tomato, cucumber, and onion to sandwiches. ? Combine a ready-made pizza crust with low-fat mozzarella cheese and lots of vegetable toppings. Try using tomatoes, squash, spinach, broccoli, carrots, cauliflower, and onions. ?  Have a variety of cut-up vegetables with a low-fat dip as an appetizer instead of chips and dip. ? Sprinkle sunflower seeds or chopped almonds over salads. Or try adding chopped walnuts or almonds to cooked vegetables. ? Try some vegetarian meals using beans and peas. Add garbanzo or kidney beans to salads. Make burritos and tacos with mashed narayanan beans or black beans. Where can you learn more? Go to http://www.gray.com/ Enter S477 in the search box to learn more about \"DASH Diet: Care Instructions. \" Current as of: August 31, 2020               Content Version: 12.8 © 3968-4393 [employer]. Care instructions adapted under license by [employer] (which disclaims liability or warranty for this information). If you have questions about a medical condition or this instruction, always ask your healthcare professional. [address] any warranty or liability for your use of this information.

## 2021-06-23 ENCOUNTER — OFFICE VISIT (OUTPATIENT)
Dept: FAMILY MEDICINE CLINIC | Age: 66
End: 2021-06-23
Payer: COMMERCIAL

## 2021-06-23 VITALS
HEIGHT: 69 IN | RESPIRATION RATE: 18 BRPM | DIASTOLIC BLOOD PRESSURE: 70 MMHG | OXYGEN SATURATION: 95 % | BODY MASS INDEX: 24.29 KG/M2 | WEIGHT: 164 LBS | HEART RATE: 65 BPM | SYSTOLIC BLOOD PRESSURE: 170 MMHG | TEMPERATURE: 96.4 F

## 2021-06-23 DIAGNOSIS — I10 ESSENTIAL HYPERTENSION: Primary | ICD-10-CM

## 2021-06-23 PROCEDURE — 3017F COLORECTAL CA SCREEN DOC REV: CPT | Performed by: FAMILY MEDICINE

## 2021-06-23 PROCEDURE — 99214 OFFICE O/P EST MOD 30 MIN: CPT | Performed by: FAMILY MEDICINE

## 2021-06-23 PROCEDURE — G8427 DOCREV CUR MEDS BY ELIG CLIN: HCPCS | Performed by: FAMILY MEDICINE

## 2021-06-23 PROCEDURE — G8400 PT W/DXA NO RESULTS DOC: HCPCS | Performed by: FAMILY MEDICINE

## 2021-06-23 PROCEDURE — G8510 SCR DEP NEG, NO PLAN REQD: HCPCS | Performed by: FAMILY MEDICINE

## 2021-06-23 PROCEDURE — 1090F PRES/ABSN URINE INCON ASSESS: CPT | Performed by: FAMILY MEDICINE

## 2021-06-23 PROCEDURE — G8420 CALC BMI NORM PARAMETERS: HCPCS | Performed by: FAMILY MEDICINE

## 2021-06-23 PROCEDURE — G8753 SYS BP > OR = 140: HCPCS | Performed by: FAMILY MEDICINE

## 2021-06-23 PROCEDURE — 1101F PT FALLS ASSESS-DOCD LE1/YR: CPT | Performed by: FAMILY MEDICINE

## 2021-06-23 PROCEDURE — G8754 DIAS BP LESS 90: HCPCS | Performed by: FAMILY MEDICINE

## 2021-06-23 PROCEDURE — G9899 SCRN MAM PERF RSLTS DOC: HCPCS | Performed by: FAMILY MEDICINE

## 2021-06-23 PROCEDURE — G8536 NO DOC ELDER MAL SCRN: HCPCS | Performed by: FAMILY MEDICINE

## 2021-06-23 RX ORDER — METOPROLOL SUCCINATE 200 MG/1
200 TABLET, EXTENDED RELEASE ORAL DAILY
Qty: 30 TABLET | Refills: 6 | Status: SHIPPED | OUTPATIENT
Start: 2021-06-23 | End: 2022-01-08

## 2021-06-23 NOTE — PROGRESS NOTES
HPI:  Lore Mahoney is a 72 y.o. female who presents today with   Chief Complaint   Patient presents with    Hypertension        Still has some elevated BPs patient is on metoprolol and hydrochlorothiazide. He takes medication daily. She historically has had difficulty tolerating blood pressure medicines. She states she feels well has no new complaints. 3 most recent PHQ Screens 6/23/2021   Little interest or pleasure in doing things Not at all   Feeling down, depressed, irritable, or hopeless Not at all   Total Score PHQ 2 0               PMH,  Meds, Allergies, Family History, Social history reviewed      Current Outpatient Medications   Medication Sig Dispense Refill    metoprolol succinate (TOPROL-XL) 200 mg XL tablet Take 1 Tablet by mouth daily. 30 Tablet 6    hydroCHLOROthiazide (HYDRODIURIL) 25 mg tablet Take 1 Tab by mouth daily. 30 Tab 6    multivitamin with minerals (HAIR,SKIN AND NAILS PO) Take  by mouth.  GLUCOSAM/CHOND/HYALU/CF BORATE (MOVE FREE JOINT HEALTH PO) Take  by mouth.  ethinyl estradiol-norethindron (JINTELI) 1-5 mg-mcg Tab Take 1 Tab by mouth daily.  psyllium seed-sucrose (METAMUCIL) Powd Take  by mouth.  MULTI-VITAMIN PO Take 1 Tab by mouth daily. Allergies   Allergen Reactions    Codeine Nausea and Vomiting     Violent vomiting    Ace Inhibitors Cough    Clonidine Other (comments)     Visual disturbance    Hyzaar [Losartan-Hydrochlorothiazide] Other (comments)     Palpitations.  Able to tolerate HCTZ                  ROS as per HPI    Visit Vitals  BP (!) 170/70   Pulse 65   Temp (!) 96.4 °F (35.8 °C) (Temporal)   Resp 18   Ht 5' 9\" (1.753 m)   Wt 164 lb (74.4 kg)   SpO2 95%   BMI 24.22 kg/m²     Physical Exam   General appearance: alert, cooperative, no distress, appears stated age  Neck: supple, symmetrical, trachea midline, no adenopathy, thyroid: not enlarged, symmetric, no tenderness/mass/nodules, no carotid bruit and no JVD  Lungs: clear to auscultation bilaterally  Heart: regular rate and rhythm, S1, S2 normal, no murmur, click, rub or gallop  Extremities: extremities normal, atraumatic, no cyanosis or edema      Assessment/Plan:    Diagnoses and all orders for this visit:    1. Essential hypertension    Other orders  -     metoprolol succinate (TOPROL-XL) 200 mg XL tablet; Take 1 Tablet by mouth daily. As above  Increase metoprolol to 200 mg daily  Continue DASH diet  Exercise as tolerated  Follow-up and Dispositions    · Return in about 2 months (around 8/23/2021) for htn. This has been fully explained to the patient, who indicates understanding. An After Visit Summary was printed and given to the patient. Follow-up and Dispositions    · Return in about 2 months (around 8/23/2021) for htn.             Efra Choi MD

## 2021-06-23 NOTE — PROGRESS NOTES
Payal Mistry presents today for   Chief Complaint   Patient presents with    Hypertension    Medication Evaluation       Is someone accompanying this pt? no    Is the patient using any DME equipment during OV? no    Depression Screening:  3 most recent PHQ Screens 6/23/2021   Little interest or pleasure in doing things Not at all   Feeling down, depressed, irritable, or hopeless Not at all   Total Score PHQ 2 0       Learning Assessment:  Learning Assessment 7/29/2015   PRIMARY LEARNER Patient   HIGHEST LEVEL OF EDUCATION - PRIMARY LEARNER  -   BARRIERS PRIMARY LEARNER -   [address] [place] [address]    NEED -   LEARNER PREFERENCE PRIMARY LISTENING     READING     DEMONSTRATION   LEARNING SPECIAL TOPICS -   ANSWERED BY patient    RELATIONSHIP SELF       Abuse Screening:  Abuse Screening Questionnaire 4/13/2021   Do you ever feel afraid of your partner? N   Are you in a relationship with someone who physically or mentally threatens you? N   Is it safe for you to go home? Y       Fall Risk  Fall Risk Assessment, last 12 mths 4/13/2021   Able to walk? Yes   Fall in past 12 months? 0   Do you feel unsteady?  0   Are you worried about falling 0       ADL  ADL Assessment 1/29/2015   Feeding yourself No Help Needed   Getting from bed to chair No Help Needed   Getting dressed No Help Needed   Bathing or showering No Help Needed   Walk across the room (includes cane/walker) No Help Needed   Using the telphone No Help Needed   Taking your medications No Help Needed   Preparing meals No Help Needed   Managing money (expenses/bills) No Help Needed   Moderately strenuous housework (laundry) No Help Needed   Shopping for personal items (toiletries/medicines) No Help Needed   Shopping for groceries No Help Needed   Driving No Help Needed   Climbing a flight of stairs No Help Needed   Getting to places beyond walking distances No Help Needed       Health Maintenance reviewed and discussed and ordered per Provider. Health Maintenance Due   Topic Date Due    COVID-19 Vaccine (1) Never done    Shingrix Vaccine Age 50> (1 of 2) Never done    Bone Densitometry (Dexa) Screening  Never done    Pneumococcal 65+ years (1 of 1 - PPSV23) Never done   . Coordination of Care:  1. Have you been to the ER, urgent care clinic since your last visit? Hospitalized since your last visit? no    2. Have you seen or consulted any other health care providers outside of the [address] [place] [address] since your last visit? Include any pap smears or colon screening.  no

## 2021-06-24 ENCOUNTER — OFFICE VISIT (OUTPATIENT)
Dept: ORTHOPEDIC SURGERY | Age: 66
End: 2021-06-24
Payer: COMMERCIAL

## 2021-06-24 VITALS
HEIGHT: 69 IN | HEART RATE: 64 BPM | WEIGHT: 166.4 LBS | OXYGEN SATURATION: 98 % | BODY MASS INDEX: 24.65 KG/M2 | TEMPERATURE: 97.3 F

## 2021-06-24 DIAGNOSIS — M54.50 LUMBAR PAIN: ICD-10-CM

## 2021-06-24 DIAGNOSIS — M79.18 MYOFASCIAL PAIN: ICD-10-CM

## 2021-06-24 DIAGNOSIS — M79.18 CHRONIC PRIMARY MUSCULOSKELETAL PAIN: Primary | ICD-10-CM

## 2021-06-24 DIAGNOSIS — G89.29 CHRONIC PRIMARY MUSCULOSKELETAL PAIN: Primary | ICD-10-CM

## 2021-06-24 DIAGNOSIS — M54.59 MECHANICAL LOW BACK PAIN: ICD-10-CM

## 2021-06-24 PROCEDURE — 72100 X-RAY EXAM L-S SPINE 2/3 VWS: CPT | Performed by: PHYSICAL MEDICINE & REHABILITATION

## 2021-06-24 PROCEDURE — 99204 OFFICE O/P NEW MOD 45 MIN: CPT | Performed by: PHYSICAL MEDICINE & REHABILITATION

## 2021-06-24 NOTE — PATIENT INSTRUCTIONS

## 2021-06-24 NOTE — PROGRESS NOTES
[address], [address]  [place], [address] [place] [address] Street  Phone: [phone]  Fax: [phone]        Jesus Cruz  : 1955  PCP: Heidi Taylor MD  2021    NEW PATIENT      HISTORY OF PRESENT ILLNESS  Glendora Scarlette Landau is a 72 y.o. female c/o episodic low back pain that began in . She describes it as aching, burning, and stiffness that comes and goes. Her pain is worse with walking, sitting, standing, and lying down. Her pain is better with gradually moving. She has stiffness with transitional movements, and it eases once she gradually starts moving. She maintains an HEP of walking 10 minutes daily at work. Pain Score: 0/10. Treatments patient has tried:  Physical therapy:No  Doing HEP: Yes  Non-opioid medications: Yes  Spinal injections: No  Spinal surgery- No.   Last Lumbar MRI: None. 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    Crystal1 MARLON Joyce is a 72year old female with chronic, episodic low back pain. Her symptoms are likely myofascial in nature. PLAN  1. Referral to PT ([facility])    Pt will f/u in 6-8 weeks or sooner if needed. Diagnoses and all orders for this visit:    1. Chronic primary musculoskeletal pain  -     REFERRAL TO PHYSICAL THERAPY    2. Mechanical low back pain  -     REFERRAL TO PHYSICAL THERAPY    3. Myofascial pain  -     REFERRAL TO PHYSICAL THERAPY    4. Lumbar pain  -     AMB POC XRAY, SPINE, LUMBOSACRAL; 2 O  -     REFERRAL TO PHYSICAL THERAPY             CHIEF COMPLAINT  Glendora Scarlette Landau is seen today in consultation at the request of Heidi Taylor MD for complaints of low back pain.       PAST MEDICAL HISTORY   Past Medical History:   Diagnosis Date    Anemia     Anemia NEC     Carpal tunnel syndrome     without loss of sensation in the right hand    Hot flashes     Hypercholesterolemia     Hypercholesterolemia 8/16/2010    Hypertension     Numbness and tingling     fingers    Prediabetes     Right wrist pain     Stenosing tenosynovitis of wrist     first dorsal compartment, right wrist       Past Surgical History:   Procedure Laterality Date    HX GYN      Fibroid Emobliation 2001    HX GYN      BTL 1979       MEDICATIONS    Current Outpatient Medications   Medication Sig Dispense Refill    metoprolol succinate (TOPROL-XL) 200 mg XL tablet Take 1 Tablet by mouth daily. 30 Tablet 6    hydroCHLOROthiazide (HYDRODIURIL) 25 mg tablet Take 1 Tab by mouth daily. 30 Tab 6    multivitamin with minerals (HAIR,SKIN AND NAILS PO) Take  by mouth.  GLUCOSAM/CHOND/HYALU/CF BORATE (MOVE FREE JOINT HEALTH PO) Take  by mouth.  ethinyl estradiol-norethindron (JINTELI) 1-5 mg-mcg Tab Take 1 Tab by mouth daily.  psyllium seed-sucrose (METAMUCIL) Powd Take  by mouth.  MULTI-VITAMIN PO Take 1 Tab by mouth daily. ALLERGIES  Allergies   Allergen Reactions    Codeine Nausea and Vomiting     Violent vomiting    Ace Inhibitors Cough    Clonidine Other (comments)     Visual disturbance    Hyzaar [Losartan-Hydrochlorothiazide] Other (comments)     Palpitations.  Able to tolerate HCTZ          SOCIAL HISTORY    Social History     Socioeconomic History    Marital status:      Spouse name: Not on file    Number of children: Not on file    Years of education: Not on file    Highest education level: Not on file   Tobacco Use    Smoking status: Never Smoker    Smokeless tobacco: Never Used   Substance and Sexual Activity    Alcohol use: No    Drug use: No    Sexual activity: Yes     Partners: Male     Comment:    Other Topics Concern    Caffeine Concern Yes     Comment: 2 servings a day    Exercise Yes     Comment: captain's chair twice a week and walking daily    Seat Belt Yes     Social Determinants of Health     Financial Resource Strain:     Difficulty of Paying Living Expenses:    Food Insecurity:     Worried About [address] in the Last Year:     [address] in the Last Year:    Transportation Needs:     Lack of Transportation (Medical):  Lack of Transportation (Non-Medical):    Physical Activity:     Days of Exercise per Week:     Minutes of Exercise per Session:    Stress:     Feeling of Stress :    Social Connections:     Frequency of Communication with Friends and Family:     Frequency of Social Gatherings with Friends and Family:     Attends Yaz[employer] Services:     Active Member of Clubs or Organizations:     Attends Club or Organization Meetings:     Marital Status:        FAMILY HISTORY  Family History   Problem Relation Age of Onset    Hypertension Mother     Heart Disease Mother     Hypertension Father     Hypertension Brother     Arthritis-osteo Other          REVIEW OF SYSTEMS  Review of Systems   Constitutional: Negative for chills, fever and weight loss. Respiratory: Negative for shortness of breath. Cardiovascular: Negative for chest pain. Gastrointestinal: Negative for constipation. Negative for fecal incontinence    Genitourinary: Negative for dysuria. Negative for urinary incontinence   Musculoskeletal: Positive for back pain. Skin: Negative for rash. Neurological: Negative for dizziness, tingling, tremors, focal weakness and headaches. Endo/Heme/Allergies: Does not bruise/bleed easily. Psychiatric/Behavioral: The patient does not have insomnia. PHYSICAL EXAMINATION  Visit Vitals  Pulse 64   Temp 97.3 °F (36.3 °C) (Temporal)   Ht 5' 9\" (1.753 m)   Wt 166 lb 6.4 oz (75.5 kg)   SpO2 98%   BMI 24.57 kg/m²         Pain Assessment  6/24/2021   Location of Pain Back   Location Modifiers -   Severity of Pain 0   Quality of Pain Aching;Burning; Other (Comment)   Quality of Pain Comment Stiff   Duration of Pain -   Frequency of Pain Intermittent   Aggravating Factors Walking;Standing; Other (Comment)   Aggravating Factors Comment Sitting, lying down   Limiting Behavior Yes   Relieving Factors Other (Comment)   Relieving Factors Comment gradually moving   Result of Injury No         Constitutional:  Well developed, well nourished, in no acute distress. Psychiatric: Affect and mood are appropriate. HEENT: Normocephalic, atraumatic. Extraocular movements intact. Integumentary: No rashes or abrasions noted on exposed areas. Cardiovascular: Regular rate and rhythm. Pulmonary: Clear to auscultation bilaterally. SPINE/MUSCULOSKELETAL EXAM    Lumbar spine:  No rash, ecchymosis, or gross obliquity. No fasciculations. No focal atrophy is noted. No pain with hip ROM. Full range of motion. Tenderness to palpation of lumbar region, upper buttock. No tenderness to palpation at the sciatic notch. SI joints minimally tender. Trochanters non tender. Sensation in the bilateral legs grossly intact to light touch. MOTOR:      Biceps  Triceps Deltoids Wrist Ext Wrist Flex Hand Intrin   Right 5/5 5/5 5/5 5/5 5/5 5/5   Left 5/5 5/5 5/5 5/5 5/5 5/5             Hip Flex  Quads Hamstrings Ankle DF EHL Ankle PF   Right 5/5 5/5 5/5 5/5 5/5 5/5   Left 5/5 5/5 5/5 5/5 5/5 5/5     DTRs are 2+ biceps, triceps, brachioradialis, patella, and Achilles. Negative Straight Leg raise. Squat not tested. No difficulty with tandem gait. Ambulation without assistive device. FWB. RADIOGRAPHS/DATA  2V (AP/LAT) Lumbar Spine XR images taken on 6/24/21 personally reviewed with patient:  Mild facet sclerosis. Adequate disc spacing  Minor diffuse endplate osteophytes  Normal alignment      reviewed    Ms. Osorio has a reminder for a \"due or due soon\" health maintenance. I have asked that she contact her primary care provider for follow-up on this health maintenance.      15 minutes of face-to-face contact were spent with the patient during today's visit extensively discussing symptoms and treatment plan. All questions were answered. More than half of this visit today was spent on counseling. Written by Oneyda Rodriguez, as dictated by Dr. Charlie Miller. I, Dr. Charlie Miller, confirm that all documentation is accurate.

## 2021-07-04 NOTE — PATIENT INSTRUCTIONS
High Blood Pressure: Care Instructions  Overview     It's normal for blood pressure to go up and down throughout the day. But if it stays up, you have high blood pressure. Another name for high blood pressure is hypertension. Despite what a lot of people think, high blood pressure usually doesn't cause headaches or make you feel dizzy or lightheaded. It usually has no symptoms. But it does increase your risk of stroke, heart attack, and other problems. You and your doctor will talk about your risks of these problems based on your blood pressure. Your doctor will give you a goal for your blood pressure. Your goal will be based on your health and your age. Lifestyle changes, such as eating healthy and being active, are always important to help lower blood pressure. You might also take medicine to reach your blood pressure goal.  Follow-up care is a key part of your treatment and safety. Be sure to make and go to all appointments, and call your doctor if you are having problems. It's also a good idea to know your test results and keep a list of the medicines you take. How can you care for yourself at home? Medical treatment  · If you stop taking your medicine, your blood pressure will go back up. You may take one or more types of medicine to lower your blood pressure. Be safe with medicines. Take your medicine exactly as prescribed. Call your doctor if you think you are having a problem with your medicine. · Talk to your doctor before you start taking aspirin every day. Aspirin can help certain people lower their risk of a heart attack or stroke. But taking aspirin isn't right for everyone, because it can cause serious bleeding. · See your doctor regularly. You may need to see the doctor more often at first or until your blood pressure comes down. · If you are taking blood pressure medicine, talk to your doctor before you take decongestants or anti-inflammatory medicine, such as ibuprofen.  Some of these medicines can raise blood pressure. · Learn how to check your blood pressure at home. Lifestyle changes  · Stay at a healthy weight. This is especially important if you put on weight around the waist. Losing even 10 pounds can help you lower your blood pressure. · If your doctor recommends it, get more exercise. Walking is a good choice. Bit by bit, increase the amount you walk every day. Try for at least 30 minutes on most days of the week. You also may want to swim, bike, or do other activities. · Avoid or limit alcohol. Talk to your doctor about whether you can drink any alcohol. · Try to limit how much sodium you eat to less than 2,300 milligrams (mg) a day. Your doctor may ask you to try to eat less than 1,500 mg a day. · Eat plenty of fruits (such as bananas and oranges), vegetables, legumes, whole grains, and low-fat dairy products. · Lower the amount of saturated fat in your diet. Saturated fat is found in animal products such as milk, cheese, and meat. Limiting these foods may help you lose weight and also lower your risk for heart disease. · Do not smoke. Smoking increases your risk for heart attack and stroke. If you need help quitting, talk to your doctor about stop-smoking programs and medicines. These can increase your chances of quitting for good. When should you call for help? Call  911 anytime you think you may need emergency care. This may mean having symptoms that suggest that your blood pressure is causing a serious heart or blood vessel problem. Your blood pressure may be over 180/120. For example, call 911 if:    · You have symptoms of a heart attack. These may include:  ? Chest pain or pressure, or a strange feeling in the chest.  ? Sweating. ? Shortness of breath. ? Nausea or vomiting. ? Pain, pressure, or a strange feeling in the back, neck, jaw, or upper belly or in one or both shoulders or arms. ? Lightheadedness or sudden weakness.   ? A fast or irregular heartbeat.     · You have symptoms of a stroke. These may include:  ? Sudden numbness, tingling, weakness, or loss of movement in your face, arm, or leg, especially on only one side of your body. ? Sudden vision changes. ? Sudden trouble speaking. ? Sudden confusion or trouble understanding simple statements. ? Sudden problems with walking or balance. ? A sudden, severe headache that is different from past headaches.     · You have severe back or belly pain. Do not wait until your blood pressure comes down on its own. Get help right away. Call your doctor now or seek immediate care if:    · Your blood pressure is much higher than normal (such as 180/120 or higher), but you don't have symptoms.     · You think high blood pressure is causing symptoms, such as:  ? Severe headache.  ? Blurry vision. Watch closely for changes in your health, and be sure to contact your doctor if:    · Your blood pressure measures higher than your doctor recommends at least 2 times. That means the top number is higher or the bottom number is higher, or both.     · You think you may be having side effects from your blood pressure medicine. Where can you learn more? Go to http://www.gray.com/  Enter [id number] in the search box to learn more about \"High Blood Pressure: Care Instructions. \"  Current as of: August 31, 2020               Content Version: 12.8  © 2006-2021 [facility] [employer]. Care instructions adapted under license by [employer] (which disclaims liability or warranty for this information). If you have questions about a medical condition or this instruction, always ask your healthcare professional. [place] [address] any warranty or liability for your use of this information.

## 2021-07-07 ENCOUNTER — HOSPITAL ENCOUNTER (OUTPATIENT)
Dept: PHYSICAL THERAPY | Age: 66
Discharge: HOME | End: 2021-07-07
Attending: PHYSICAL MEDICINE & REHABILITATION
Payer: COMMERCIAL

## 2021-07-07 PROCEDURE — 97110 THERAPEUTIC EXERCISES: CPT

## 2021-07-07 PROCEDURE — 97535 SELF CARE MNGMENT TRAINING: CPT

## 2021-07-07 PROCEDURE — 97161 PT EVAL LOW COMPLEX 20 MIN: CPT

## 2021-07-07 NOTE — PROGRESS NOTES
PT DAILY TREATMENT NOTE     Patient Name: Jacqueline President  Date:2021  : 1955  [x]  Patient  Verified  Payor: BLUE CROSS / Plan: [facility] / Product Type: PPO /    In time:125  Out time:215  Total Treatment Time (min): 50  Visit #: 1 of 6    Medicare/BCBS Only   Total Timed Codes (min):  50 1:1 Treatment Time:  38       Treatment Area: Lumbar back pain [M54.5]  Mechanical low back pain [M54.5]  Myofascial pain [M79.18]    SUBJECTIVE  Pain Level (0-10 scale): 0  Any medication changes, allergies to medications, adverse drug reactions, diagnosis change, or new procedure performed?: [x] No    [] Yes (see summary sheet for update)  Subjective functional status/changes:   [] No changes reported  See paper evaluation and POC. OBJECTIVE     12 min [x]Eval                  []Re-Eval       24 min Therapeutic Exercise:  [] See flow sheet : HEP creation, education and performance. Rationale: increase ROM, increase strength and improve coordination to improve the patients ability to perform ADLs. 14 min Self Care/Home management:  []  See flow sheet : discussion of using stretching and heat to reduce stiffness and pain. Education regarding relevant anatomy and pathology as it relates to self care. Rationale: increase ROM and decrease pain  to improve the patients ability to perform ADLs. With   [] TE   [] TA   [] neuro   [] other: Patient Education: [x] Review HEP    [] Progressed/Changed HEP based on:   [] positioning   [] body mechanics   [] transfers   [] heat/ice application    [] other:      Other Objective/Functional Measures: see paper evaluation and POC     Pain Level (0-10 scale) post treatment: 0    ASSESSMENT/Changes in Function: Pt is a 59-year-old woman presenting to the clinic today with c/o increased aching pain and stiffness over the last year in her low back.  Pain has been intermittent, mostly noted when turning in bed, getting out of bed or a chair, or with prolonged standing. Walking, movement, and sitting are not limited. Pain radiates to the B buttocks with a slight burning sensation when present. Impairments found today include decreased lumbar ROM, impaired piriformis and HS flexibility, reduced gluteal and abdominal stability strength, tenderness of the left piriformis and B HS with stretching, reduced B LE ROM, and pain with positional changes. Signs and symptoms are consistent with mechanical LBP aggravated by reduced flexibility. Pt will benefit from skilled PT services with incorporation of pilates-based PT to address the aforementioned impairments and progress towards goal attainment. Patient will continue to benefit from skilled PT services to modify and progress therapeutic interventions, address functional mobility deficits, address ROM deficits, address strength deficits, analyze and address soft tissue restrictions, analyze and cue movement patterns, analyze and modify body mechanics/ergonomics, assess and modify postural abnormalities, address imbalance/dizziness and instruct in home and community integration to attain remaining goals. [x]  See Plan of Care  []  See progress note/recertification  []  See Discharge Summary         Progress towards goals / Updated goals:  Short Term Goals: To be accomplished in 2 weeks:  1. Pt will report daily compliance with her HEP to maximize therapeutic success. Eval: HEP assigned  2. Pt will perform PPT with correct form, indicating improved abdominal stability for ADLs. Eval: frequent corrections for form  Long Term Goals: To be accomplished in 6 weeks:  1. Pt will improve her FOTO score to 71, demonstrating improved functional capacity for ADLs. Eval: 61  2. Pt will improve her BROCK stretch to > or = 45 degrees as measured by an inclinometer, indicating improved flexibility for self care. Eval: 20 deg right LE, 25 deg left LE  3.  Pt will improve her 90/90 HS flexibility test to < or = to 25 deg B to reduce LBP. Eval: 35 deg right, 40 deg left  4. Pt will demonstrate improved B gluteus medius and kaylee strength to 4+/5 MMT to improve ease of performing functional activities. Eval: glute med 4/5 B, glute max 3+/5 B  5. Pt will demonstrate 10 reciprocal bird dog repetitions with good form, showing improved abdominal strength to reduce pain with positional changes.    Eval: alternating LE extension only with poor abdominal/pelvic control      PLAN  []  Upgrade activities as tolerated     [x]  Continue plan of care  []  Update interventions per flow sheet       []  Discharge due to:_  []  Other:_      Haveradha Leonardo, PT 7/7/2021  2:20 PM    Future Appointments   Date Time Provider Sophia Vicky   7/14/2021  8:15 AM [facility]   7/22/2021  2:30 PM [facility]   7/29/2021  2:30 PM [facility]   8/19/2021  3:45 PM Nickolas Jack MD [facility] BS AM[facility]   10/13/2021 12:00 PM Winnie Knox MD [facility] BS AMB

## 2021-07-07 NOTE — PROGRESS NOTES
[facility]  [address]  [place], [address]  [phone] fax    Plan of Care/ Statement of Necessity for Physical Therapy Services    Patient name: Sol Moran Start of Care: 2021   Referral source: Lukas Doe MD : 1955    Medical Diagnosis: Lumbar back pain [M54.5]  Mechanical low back pain [M54.5]  Myofascial pain [M79.18]  Payor: BLUE CROSS / Plan: [facility] [place] / Product Type: PPO /  Onset Date:1 year    Treatment Diagnosis: LBP   Prior Hospitalization: see medical history Provider#: [id number]   Medications: Verified on Patient summary List    Comorbidities: back pain, high blood pressure   Prior Level of Function: very little back pain over the years, maintains daily walking routine     The Plan of Care and following information is based on the information from the initial evaluation. Assessment/ key information: Pt is a 27-year-old woman presenting to the clinic today with c/o increased aching pain and stiffness over the last year in her low back. Pain has been intermittent, mostly noted when turning in bed, getting out of bed or a chair, or with prolonged standing. Walking, movement, and sitting are not limited. Pain radiates to the B buttocks with a slight burning sensation when present. Impairments found today include decreased lumbar ROM, impaired piriformis and HS flexibility, reduced gluteal and abdominal stability strength, tenderness of the left piriformis and B HS with stretching, reduced B LE ROM, and pain with positional changes. Signs and symptoms are consistent with mechanical LBP aggravated by reduced flexibility. Pt will benefit from skilled PT services with incorporation of pilates-based PT to address the aforementioned impairments and progress towards goal attainment.      Evaluation Complexity History MEDIUM  Complexity : 1-2 comorbidities / personal factors will impact the outcome/ POC ; Examination MEDIUM Complexity : 3 Standardized tests and measures addressing body structure, function, activity limitation and / or participation in recreation  ;Presentation LOW Complexity : Stable, uncomplicated  ;Clinical Decision Making MEDIUM Complexity : FOTO score of 26-74  Overall Complexity Rating: LOW   Problem List: pain affecting function, decrease ROM, decrease strength, impaired gait/ balance, decrease ADL/ functional abilitiies, decrease activity tolerance, decrease flexibility/ joint mobility and decrease transfer abilities   Treatment Plan may include any combination of the following: Therapeutic exercise, Therapeutic activities, Neuromuscular re-education, Physical agent/modality, Gait/balance training, Manual therapy, Aquatic therapy, Patient education, Self Care training, Functional mobility training, Home safety training and Stair training  Patient / Family readiness to learn indicated by: asking questions, trying to perform skills and interest  Persons(s) to be included in education: patient (P)  Barriers to Learning/Limitations: None  Patient Goal (s): get rid of soreness and stiffness in back and buttock  Patient Self Reported Health Status: fair  Rehabilitation Potential: good    Short Term Goals: To be accomplished in 2 weeks:  1. Pt will report daily compliance with her HEP to maximize therapeutic success. 2. Pt will perform PPT with correct form, indicating improved abdominal stability for ADLs. Long Term Goals: To be accomplished in 6 weeks:  1. Pt will improve her FOTO score to 71, demonstrating improved functional capacity for ADLs. 2. Pt will improve her BROCK stretch to > or = 45 degrees as measured by an inclinometer, indicating improved flexibility for self care. 3. Pt will improve her 90/90 HS flexibility test to < or = to 25 deg B to reduce LBP.   4. Pt will demonstrate improved B gluteus medius and kaylee strength to 4+/5 MMT to improve ease of performing functional activities. 5. Pt will demonstrate 10 reciprocal bird dog repetitions with good form, showing improved abdominal strength to reduce pain with positional changes. Frequency / Duration: Patient to be seen 1 times per week for 6 weeks. Patient/ Caregiver education and instruction: Diagnosis, prognosis, self care, activity modification and exercises   [x]  Plan of care has been reviewed with AMBAR Leonardo, PT 7/7/2021 2:32 PM    ________________________________________________________________________    I certify that the above Therapy Services are being furnished while the patient is under my care. I agree with the treatment plan and certify that this therapy is necessary.     [de-identified] Signature:____________Date:_________TIME:________     Nickolas Jack MD  ** Signature, Date and Time must be completed for valid certification **    Please sign and return to [facility] [address]  [address]  [place], [address]  [phone] fax

## 2021-07-14 ENCOUNTER — HOSPITAL ENCOUNTER (OUTPATIENT)
Dept: PHYSICAL THERAPY | Age: 66
Discharge: HOME | End: 2021-07-14
Attending: PHYSICAL MEDICINE & REHABILITATION
Payer: COMMERCIAL

## 2021-07-14 PROCEDURE — 97112 NEUROMUSCULAR REEDUCATION: CPT

## 2021-07-14 PROCEDURE — 97110 THERAPEUTIC EXERCISES: CPT

## 2021-07-14 NOTE — PROGRESS NOTES
815PT DAILY TREATMENT NOTE 10-18    Patient Name: Navin Osorio  Date:2021  : 1955  [x]  Patient  Verified  Payor: BLUE CROSS / Plan: [facility] [place] / Product Type: PPO /    In time:815  Out time:858  Total Treatment Time (min): 43  Visit #: 2 of 6    Medicare/BCBS Only   Total Timed Codes (min):  43 1:1 Treatment Time:  43       Treatment Area: Lumbar back pain [M54.5]  Mechanical low back pain [M54.5]  Myofascial pain [M79.18]    SUBJECTIVE  Pain Level (0-10 scale): 0  Any medication changes, allergies to medications, adverse drug reactions, diagnosis change, or new procedure performed?: [x] No    [] Yes (see summary sheet for update)  Subjective functional status/changes:   [] No changes reported  I've been doing my HEP. OBJECTIVE        8 min Therapeutic Exercise:  [] See flow sheet :   Rationale: increase ROM and increase strength to improve the patients ability to perform daily activities      35 min Neuromuscular Re-education:  []  See flow sheet :   Rationale: increase strength and improve coordination  to improve the patients ability to recruit TA and glutes daily activities     With   [] TE   [] TA   [] neuro   [] other: Patient Education: [x] Review HEP    [] Progressed/Changed HEP based on:   [] positioning   [] body mechanics   [] transfers   [] heat/ice application    [] other:      Other Objective/Functional Measures:      Pain Level (0-10 scale) post treatment: 0    ASSESSMENT/Changes in Function: 1st f/u with good effort. Required constant cueing for form with all exercises. Unable to perform QP exercises without significant compensation.      Patient will continue to benefit from skilled PT services to modify and progress therapeutic interventions, address functional mobility deficits, address strength deficits, analyze and address soft tissue restrictions, analyze and cue movement patterns and assess and modify postural abnormalities to attain remaining goals.     []  See Plan of Care  []  See progress note/recertification  []  See Discharge Summary         Progress towards goals / Updated goals:  Short Term Goals: To be accomplished in 2 weeks:  1. Pt will report daily compliance with her HEP to maximize therapeutic success. Eval: HEP assigned   Current: goal met 7/14/21  2. Pt will perform PPT with correct form, indicating improved abdominal stability for ADLs. Eval: frequent corrections for form  Long Term Goals: To be accomplished in 6 weeks:  1. Pt will improve her FOTO score to 71, demonstrating improved functional capacity for ADLs. Eval: 61  2. Pt will improve her BROCK stretch to > or = 45 degrees as measured by an inclinometer, indicating improved flexibility for self care. Eval: 20 deg right LE, 25 deg left LE  3. Pt will improve her 90/90 HS flexibility test to < or = to 25 deg B to reduce LBP. Eval: 35 deg right, 40 deg left  4. Pt will demonstrate improved B gluteus medius and kaylee strength to 4+/5 MMT to improve ease of performing functional activities. Eval: glute med 4/5 B, glute max 3+/5 B  5. Pt will demonstrate 10 reciprocal bird dog repetitions with good form, showing improved abdominal strength to reduce pain with positional changes.               Eval: alternating LE extension only with poor abdominal/pelvic control    PLAN  []  Upgrade activities as tolerated     []  Continue plan of care  []  Update interventions per flow sheet       []  Discharge due to:_  []  Other:_      Laverta Mom, MPT, CMTPT 7/14/2021  8:22 AM    Future Appointments   Date Time Provider Sophia Perry   7/22/2021  2:30 PM Azell Clore, PTA [facility]   7/29/2021  2:30 PM Azell Clore, PTA [facility]   8/19/2021  3:45 PM Alfredo Hood MD [facility] BS AMB   10/13/2021 12:00 PM Danielle Kay MD [facility] BS AMB

## 2021-07-22 ENCOUNTER — APPOINTMENT (OUTPATIENT)
Dept: PHYSICAL THERAPY | Age: 66
End: 2021-07-22
Attending: PHYSICAL MEDICINE & REHABILITATION
Payer: COMMERCIAL

## 2021-07-29 ENCOUNTER — APPOINTMENT (OUTPATIENT)
Dept: PHYSICAL THERAPY | Age: 66
End: 2021-07-29
Attending: PHYSICAL MEDICINE & REHABILITATION
Payer: COMMERCIAL

## 2021-08-19 NOTE — PROGRESS NOTES
[facility]  [address]  [place], [address]  [phone] fax    Physical Therapy Discharge Summary  Patient name: Luciano Barney Start of Care: 2021   Referral source: Lisset Espinosa MD : 1955   Medical/Treatment Diagnosis: Lumbar back pain [M54.5]  Mechanical low back pain [M54.5]  Myofascial pain [M79.18]  Payor: [employer] / Plan: [id number] [facility] [place] / Product Type: PPO /  Onset Date:1 year     Prior Hospitalization: see medical history Provider#: [id number]   Medications: Verified on Patient Summary List    Comorbidities: back pain, high blood pressure   Prior Level of Function: very little back pain over the years, maintains daily walking routine  Visits from Start of Care: 2    Missed Visits: 1  Reporting Period : 2021 to 2021      Summary of Care:  Short Term Goals: To be accomplished in 2 weeks:  1. Pt will report daily compliance with her HEP to maximize therapeutic success.              Eval: HEP assigned              Current: goal met 21  2. Pt will perform PPT with correct form, indicating improved abdominal stability for ADLs.             Eval: frequent corrections for form  Long Term Goals: To be accomplished in 6 weeks:  1. Pt will improve her FOTO score to 71, demonstrating improved functional capacity for ADLs.             Eval: 61  2. Pt will improve her BROCK stretch to > or = 45 degrees as measured by an inclinometer, indicating improved flexibility for self care.              Eval: 20 deg right LE, 25 deg left LE  3. Pt will improve her 90/90 HS flexibility test to < or = to 25 deg B to reduce LBP.             Eval: 35 deg right, 40 deg left  4. Pt will demonstrate improved B gluteus medius and kaylee strength to 4+/5 MMT to improve ease of performing functional activities.             Eval: glute med 4/5 B, glute max 3+/5 B  5.  Pt will demonstrate 10 reciprocal bird dog repetitions with good form, showing improved abdominal strength to reduce pain with positional changes.             Eval: alternating LE extension only with poor abdominal/pelvic control      ASSESSMENT/RECOMMENDATIONS:   Pt has not returned to therapy since 7/14/2021 and is being discharged at this time. Unable to reassess goals at this time. D/C without further pt instructions.  Thank you for this referral    [x]Discontinue therapy: []Patient has reached or is progressing toward set goals      [x]Patient is non-compliant or has abdicated      []Due to lack of appreciable progress towards set goals    Karin Barbosa, PT 8/19/2021 8:48 AM

## 2021-10-02 DIAGNOSIS — I10 ESSENTIAL HYPERTENSION: ICD-10-CM

## 2021-10-05 RX ORDER — HYDROCHLOROTHIAZIDE 25 MG/1
TABLET ORAL
Qty: 90 TABLET | Refills: 3 | Status: SHIPPED | OUTPATIENT
Start: 2021-10-05 | End: 2022-05-31 | Stop reason: SDUPTHER

## 2021-10-07 ENCOUNTER — HOSPITAL ENCOUNTER (OUTPATIENT)
Dept: LAB | Age: 66
Discharge: HOME | End: 2021-10-07
Payer: COMMERCIAL

## 2021-10-07 DIAGNOSIS — I10 ESSENTIAL HYPERTENSION: ICD-10-CM

## 2021-10-07 LAB
ALBUMIN SERPL-MCNC: 3.8 G/DL (ref 3.4–5)
ALBUMIN/GLOB SERPL: 1.2 {RATIO} (ref 0.8–1.7)
ALP SERPL-CCNC: 66 U/L (ref 45–117)
ALT SERPL-CCNC: 30 U/L (ref 13–56)
ANION GAP SERPL CALC-SCNC: 6 MMOL/L (ref 3–18)
AST SERPL-CCNC: 20 U/L (ref 10–38)
BILIRUB SERPL-MCNC: 0.2 MG/DL (ref 0.2–1)
BUN SERPL-MCNC: 14 MG/DL (ref 7–18)
BUN/CREAT SERPL: 14 (ref 12–20)
CALCIUM SERPL-MCNC: 9.7 MG/DL (ref 8.5–10.1)
CHLORIDE SERPL-SCNC: 106 MMOL/L (ref 100–111)
CHOLEST SERPL-MCNC: 154 MG/DL
CO2 SERPL-SCNC: 30 MMOL/L (ref 21–32)
CREAT SERPL-MCNC: 0.98 MG/DL (ref 0.6–1.3)
GLOBULIN SER CALC-MCNC: 3.1 G/DL (ref 2–4)
GLUCOSE SERPL-MCNC: 111 MG/DL (ref 74–99)
HDLC SERPL-MCNC: 35 MG/DL (ref 40–60)
HDLC SERPL: 4.4 {RATIO} (ref 0–5)
LDLC SERPL CALC-MCNC: 99.6 MG/DL (ref 0–100)
LIPID PROFILE,FLP: ABNORMAL
POTASSIUM SERPL-SCNC: 3.8 MMOL/L (ref 3.5–5.5)
PROT SERPL-MCNC: 6.9 G/DL (ref 6.4–8.2)
SODIUM SERPL-SCNC: 142 MMOL/L (ref 136–145)
TRIGL SERPL-MCNC: 97 MG/DL (ref ?–150)
VLDLC SERPL CALC-MCNC: 19.4 MG/DL

## 2021-10-07 PROCEDURE — 80061 LIPID PANEL: CPT

## 2021-10-07 PROCEDURE — 80053 COMPREHEN METABOLIC PANEL: CPT

## 2021-10-07 PROCEDURE — 36415 COLL VENOUS BLD VENIPUNCTURE: CPT

## 2021-10-14 ENCOUNTER — OFFICE VISIT (OUTPATIENT)
Dept: FAMILY MEDICINE CLINIC | Age: 66
End: 2021-10-14
Payer: COMMERCIAL

## 2021-10-14 VITALS
WEIGHT: 159.6 LBS | OXYGEN SATURATION: 96 % | HEART RATE: 67 BPM | DIASTOLIC BLOOD PRESSURE: 65 MMHG | HEIGHT: 69 IN | BODY MASS INDEX: 23.64 KG/M2 | SYSTOLIC BLOOD PRESSURE: 150 MMHG | RESPIRATION RATE: 12 BRPM | TEMPERATURE: 96.8 F

## 2021-10-14 DIAGNOSIS — I10 PRIMARY HYPERTENSION: Primary | ICD-10-CM

## 2021-10-14 PROCEDURE — 99213 OFFICE O/P EST LOW 20 MIN: CPT | Performed by: FAMILY MEDICINE

## 2021-10-14 NOTE — PROGRESS NOTES
HPI:  Martha Rivas is a 72 y.o. female who presents today with   Chief Complaint   Patient presents with    Hypertension     6 m f/u         Home BPs have been stable 649Y- 893G/55V systolic . Tolerated increased dose of metoprolol. Had a recent COVID booster. Lab Results   Component Value Date/Time    Hemoglobin A1c 6.2 (H) 03/27/2021 08:44 AM                 3 most recent PHQ Screens 10/14/2021   PHQ Not Done Patient refuses   Little interest or pleasure in doing things Not at all   Feeling down, depressed, irritable, or hopeless Not at all   Total Score PHQ 2 0               PMH,  Meds, Allergies, Family History, Social history reviewed      Current Outpatient Medications   Medication Sig Dispense Refill    hydroCHLOROthiazide (HYDRODIURIL) 25 mg tablet take 1 tablet by mouth once daily 90 Tablet 3    metoprolol succinate (TOPROL-XL) 200 mg XL tablet Take 1 Tablet by mouth daily. 30 Tablet 6    GLUCOSAM/CHOND/HYALU/CF BORATE (MOVE FREE Datadecision PO) Take  by mouth.  ethinyl estradiol-norethindron (JINTELI) 1-5 mg-mcg Tab Take 1 Tab by mouth daily.  psyllium seed-sucrose (METAMUCIL) Powd Take  by mouth.  MULTI-VITAMIN PO Take 1 Tab by mouth daily. Allergies   Allergen Reactions    Codeine Nausea and Vomiting     Violent vomiting    Ace Inhibitors Cough    Clonidine Other (comments)     Visual disturbance    Hyzaar [Losartan-Hydrochlorothiazide] Other (comments)     Palpitations.  Able to tolerate HCTZ                  ROS as per HPI    Visit Vitals  BP (!) 150/65 (BP 1 Location: Right arm, BP Patient Position: Sitting, BP Cuff Size: Large adult)   Pulse 67   Temp 96.8 °F (36 °C) (Temporal)   Resp 12   Ht 5' 9\" (1.753 m)   Wt 159 lb 9.6 oz (72.4 kg)   SpO2 96%   BMI 23.57 kg/m²     Physical Exam   General appearance: alert, cooperative, no distress, appears stated age  Neck: supple, symmetrical, trachea midline, no adenopathy, thyroid: not enlarged, symmetric, no tenderness/mass/nodules, no carotid bruit and no JVD  Lungs: clear to auscultation bilaterally  Heart: regular rate and rhythm, S1, S2 normal, no murmur, click, rub or gallop  Extremities: extremities normal, atraumatic, no cyanosis or edema    Assessment/Plan:    Diagnoses and all orders for this visit:    1. Primary hypertension      As above  Patient's home blood pressures are stable  She is to continue hydrochlorothiazide and metoprolol as ordered  Continue diet and exercise to control blood pressure  Will monitor  Follow-up and Dispositions    · Return in about 6 months (around 4/14/2022). This has been fully explained to the patient, who indicates understanding. An After Visit Summary was printed and given to the patient. Follow-up and Dispositions    · Return in about 6 months (around 4/14/2022).             Rasheed Pinzon MD

## 2021-10-14 NOTE — PATIENT INSTRUCTIONS
Learning About ACE Inhibitors  What are ACE inhibitors? ACE (angiotensin-converting enzyme) inhibitors are medicines that lower blood pressure. They stop the release of an enzyme. This enzyme makes your blood vessels smaller. Without it, your blood vessels relax and get bigger. This lowers your blood pressure. These medicines also increase how much water and salt go into your urine. This also lowers blood pressure. You may take this kind of medicine if you have high blood pressure. Or you may take it if you have heart problems, kidney problems, or diabetes. If you have coronary artery disease, this medicine can help prevent heart attacks and strokes. Examples  · benazepril (Lotensin)  · enalapril (Vasotec)  · lisinopril (Prinivil, Zestril)  · ramipril (Altace)  This is not a complete list.  Possible side effects  Side effects may include:  · A cough. · Low blood pressure. This can make you feel dizzy or weak. · Too much potassium in your body. · Swelling of your lips, tongue, or face. If the swelling is severe, you may need treatment right away. Severe swelling can make it hard to breathe, but this is rare. You may have other side effects or reactions not listed here. Check the information that comes with your medicine. What to know about taking this medicine  · ACE inhibitors can cause a dry cough. Talk to your doctor if you have a dry cough. You may need a different medicine. · These medicines can cause an allergic reaction. This can cause a little swelling. Or it can cause red bumps on your skin that hurt. In rare cases, the swelling may make it hard for you to breathe. · Do not take this medicine if you are pregnant or plan to become pregnant. · Take your medicines exactly as prescribed. Call your doctor if you think you are having a problem with your medicine. · Check with your doctor or pharmacist before you use any other medicines. This includes over-the-counter medicines.  Make sure your doctor knows all of the medicines, vitamins, herbal products, and supplements you take. Taking some medicines together can cause problems. · You may need regular blood tests. Where can you learn more? Go to http://www.gray.com/  Enter P050 in the search box to learn more about \"Learning About ACE Inhibitors. \"  Current as of: April 29, 2021               Content Version: 13.0  © 2006-2021 [employer]. Care instructions adapted under license by [employer] (which disclaims liability or warranty for this information). If you have questions about a medical condition or this instruction, always ask your healthcare professional. [address] any warranty or liability for your use of this information.

## 2021-10-14 NOTE — PROGRESS NOTES
Chief Complaint   Patient presents with    Hypertension     6 m f/u        Pt preferred language for health care discussion is english. Is someone accompanying this pt? no    Is the patient using any DME equipment during OV? no    Depression Screening:  3 most recent [facility] Screens 10/14/2021 6/23/2021 4/13/2021 11/9/2020 3/31/2020 7/16/2019 1/16/2019   PHQ Not Done Patient refuses - - - - - -   Little interest or pleasure in doing things Not at all Not at all Not at all Not at all Not at all Not at all Not at all   Feeling down, depressed, irritable, or hopeless Not at all Not at all Not at all Not at all Not at all Not at all Not at all   Total Score PHQ 2 0 0 0 0 0 0 0       Learning Assessment:  Learning Assessment 7/29/2015 3/12/2014 3/26/2013   PRIMARY LEARNER Patient Patient Patient   HIGHEST LEVEL OF EDUCATION - PRIMARY LEARNER  - [address] LEARNER - [address] CAREGIVER - No -   [facility]    NEED - No -   LEARNER PREFERENCE PRIMARY LISTENING DEMONSTRATION DEMONSTRATION     READING - READING     DEMONSTRATION - [facility] - no -   ANSWERED BY patient  patient patient   RELATIONSHIP SELF SELF SELF       Abuse Screening:  Abuse Screening Questionnaire 10/14/2021 6/23/2021 4/13/2021 11/9/2020 2/14/2018 1/29/2015   Do you ever feel afraid of your partner? N N N N N N   Are you in a relationship with someone who physically or mentally threatens you? N N N N N N   Is it safe for you to go home? Radhika GAO       Fall Risk  Fall Risk Assessment, last 12 mths 6/23/2021   Able to walk? Yes   Fall in past 12 months? 0   Do you feel unsteady? 0   Are you worried about falling 0           Advance Directive:  1. Do you have an advance directive in place? Patient Reply:yes      Coordination of Care:  1. Have you been to the ER, urgent care clinic since your last visit? Hospitalized since your last visit? no    2.  Have you seen or consulted any other health care providers outside of the [address] [place] [address] since your last visit? Include any pap smears or colon screening.  Yes,GYN

## 2021-12-31 LAB — SARS-COV-2, NAA: POSITIVE

## 2022-01-08 RX ORDER — METOPROLOL SUCCINATE 200 MG/1
TABLET, EXTENDED RELEASE ORAL
Qty: 210 TABLET | Refills: 0 | Status: SHIPPED | OUTPATIENT
Start: 2022-01-08 | End: 2022-05-31 | Stop reason: SDUPTHER

## 2022-03-28 ENCOUNTER — OFFICE VISIT (OUTPATIENT)
Dept: FAMILY MEDICINE CLINIC | Age: 67
End: 2022-03-28
Payer: COMMERCIAL

## 2022-03-28 VITALS
RESPIRATION RATE: 16 BRPM | DIASTOLIC BLOOD PRESSURE: 82 MMHG | WEIGHT: 162.8 LBS | HEIGHT: 69 IN | SYSTOLIC BLOOD PRESSURE: 162 MMHG | HEART RATE: 60 BPM | BODY MASS INDEX: 24.11 KG/M2 | TEMPERATURE: 96.9 F | OXYGEN SATURATION: 96 %

## 2022-03-28 DIAGNOSIS — R73.03 PREDIABETES: ICD-10-CM

## 2022-03-28 DIAGNOSIS — I10 PRIMARY HYPERTENSION: Primary | ICD-10-CM

## 2022-03-28 PROCEDURE — 99214 OFFICE O/P EST MOD 30 MIN: CPT | Performed by: FAMILY MEDICINE

## 2022-03-28 RX ORDER — AMLODIPINE BESYLATE 5 MG/1
5 TABLET ORAL DAILY
Qty: 30 TABLET | Refills: 6 | Status: SHIPPED | OUTPATIENT
Start: 2022-03-28 | End: 2022-09-25 | Stop reason: SDUPTHER

## 2022-03-28 NOTE — PROGRESS NOTES
1. \"Have you been to the ER, urgent care clinic since your last visit? Hospitalized since your last visit? \" No    2. \"Have you seen or consulted any other health care providers outside of the [address] [place] [address] since your last visit? \" yes GYN      3. For patients aged 39-70: Has the patient had a colonoscopy / FIT/ Cologuard? Yes - no Care Gap present      If the patient is female:    4. For patients aged 41-77: Has the patient had a mammogram within the past 2 years? Yes - no Care Gap present      5. For patients aged 21-65: Has the patient had a pap smear?  Yes - no Care Gap present

## 2022-03-28 NOTE — PATIENT INSTRUCTIONS

## 2022-03-28 NOTE — PROGRESS NOTES
HPI:  Bessie Jensen is a 77 y.o. female who presents today with   Chief Complaint   Patient presents with    Hypertension     5 m f/u       States that her BPs are up and down;   Home readings have also been up as well;   Has no symptoms such as HA, CP, SOB    She is compliant with her BP meds as listed below; no refills requested              3 most recent PHQ Screens 3/28/2022   PHQ Not Done Patient refuses   Little interest or pleasure in doing things Not at all   Feeling down, depressed, irritable, or hopeless Not at all   Total Score PHQ 2 0               PMH,  Meds, Allergies, Family History, Social history reviewed      Current Outpatient Medications   Medication Sig Dispense Refill    amLODIPine (NORVASC) 5 mg tablet Take 1 Tablet by mouth daily. 30 Tablet 6    metoprolol succinate (TOPROL-XL) 200 mg XL tablet take 1 tablet by mouth once daily 210 Tablet 0    hydroCHLOROthiazide (HYDRODIURIL) 25 mg tablet take 1 tablet by mouth once daily 90 Tablet 3    GLUCOSAM/CHOND/HYALU/CF BORATE (MOVE FREE JO[facility]ALTH PO) Take  by mouth.  ethinyl estradiol-norethindron (JINTELI) 1-5 mg-mcg Tab Take 1 Tab by mouth daily.  psyllium seed-sucrose (METAMUCIL) Powd Take  by mouth.  MULTI-VITAMIN PO Take 1 Tab by mouth daily. Allergies   Allergen Reactions    Codeine Nausea and Vomiting     Violent vomiting    Ace Inhibitors Cough    Clonidine Other (comments)     Visual disturbance    Hyzaar [Losartan-Hydrochlorothiazide] Other (comments)     Palpitations.  Able to tolerate HCTZ                  ROS as per HPI      Visit Vitals  BP (!) 162/82   Pulse 60   Temp 96.9 °F (36.1 °C) (Temporal)   Resp 16   Ht 5' 9\" (1.753 m)   Wt 162 lb 12.8 oz (73.8 kg)   SpO2 96%   BMI 24.04 kg/m²     Physical Exam   General appearance: alert, cooperative, no distress, appears stated age  Neck: supple, symmetrical, trachea midline, no adenopathy, thyroid: not enlarged, symmetric, no tenderness/mass/nodules, no carotid bruit and no JVD  Lungs: clear to auscultation bilaterally  Heart: regular rate and rhythm, S1, S2 normal, no murmur, click, rub or gallop  Extremities: extremities normal, atraumatic, no cyanosis or edema    Assessment/Plan:    Diagnoses and all orders for this visit:    1. Primary hypertension  -     LIPID PANEL; Future  -     METABOLIC PANEL, COMPREHENSIVE; Future    2. Prediabetes  -     HEMOGLOBIN A1C WITH EAG; Future    Other orders  -     amLODIPine (NORVASC) 5 mg tablet; Take 1 Tablet by mouth daily. As above  BP not controlled   Add Novasc again ( had med in 2012 but stopped it due to eye pain thought not to be related to med)  Cont HCTZ and toprol  Monitor  Labs as ordered; recheck A1c  Follow-up and Dispositions    · Return in about 2 months (around 5/28/2022) for htn. This has been fully explained to the patient, who indicates understanding. An After Visit Summary was printed and given to the patient. Follow-up and Dispositions    · Return in about 2 months (around 5/28/2022) for htn.             Yunior Dodge MD

## 2022-05-28 ENCOUNTER — HOSPITAL ENCOUNTER (OUTPATIENT)
Dept: LAB | Age: 67
Discharge: HOME | End: 2022-05-28
Payer: COMMERCIAL

## 2022-05-28 DIAGNOSIS — I10 PRIMARY HYPERTENSION: ICD-10-CM

## 2022-05-28 DIAGNOSIS — R73.03 PREDIABETES: ICD-10-CM

## 2022-05-28 LAB
ALBUMIN SERPL-MCNC: 4.2 G/DL (ref 3.4–5)
ALBUMIN/GLOB SERPL: 1.3 {RATIO} (ref 0.8–1.7)
ALP SERPL-CCNC: 58 U/L (ref 45–117)
ALT SERPL-CCNC: 32 U/L (ref 13–56)
ANION GAP SERPL CALC-SCNC: 5 MMOL/L (ref 3–18)
AST SERPL-CCNC: 22 U/L (ref 10–38)
BILIRUB SERPL-MCNC: 0.3 MG/DL (ref 0.2–1)
BUN SERPL-MCNC: 12 MG/DL (ref 7–18)
BUN/CREAT SERPL: 12 (ref 12–20)
CALCIUM SERPL-MCNC: 9.9 MG/DL (ref 8.5–10.1)
CHLORIDE SERPL-SCNC: 106 MMOL/L (ref 100–111)
CHOLEST SERPL-MCNC: 154 MG/DL
CO2 SERPL-SCNC: 31 MMOL/L (ref 21–32)
CREAT SERPL-MCNC: 0.99 MG/DL (ref 0.6–1.3)
EST. AVERAGE GLUCOSE BLD GHB EST-MCNC: 137 MG/DL
GLOBULIN SER CALC-MCNC: 3.2 G/DL (ref 2–4)
GLUCOSE SERPL-MCNC: 118 MG/DL (ref 74–99)
HBA1C MFR BLD: 6.4 % (ref 4.2–5.6)
HDLC SERPL-MCNC: 39 MG/DL (ref 40–60)
HDLC SERPL: 3.9 {RATIO} (ref 0–5)
LDLC SERPL CALC-MCNC: 94.4 MG/DL (ref 0–100)
LIPID PROFILE,FLP: ABNORMAL
POTASSIUM SERPL-SCNC: 3.9 MMOL/L (ref 3.5–5.5)
PROT SERPL-MCNC: 7.4 G/DL (ref 6.4–8.2)
SODIUM SERPL-SCNC: 142 MMOL/L (ref 136–145)
TRIGL SERPL-MCNC: 103 MG/DL (ref ?–150)
VLDLC SERPL CALC-MCNC: 20.6 MG/DL

## 2022-05-28 PROCEDURE — 80053 COMPREHEN METABOLIC PANEL: CPT

## 2022-05-28 PROCEDURE — 80061 LIPID PANEL: CPT

## 2022-05-28 PROCEDURE — 83036 HEMOGLOBIN GLYCOSYLATED A1C: CPT

## 2022-05-28 PROCEDURE — 36415 COLL VENOUS BLD VENIPUNCTURE: CPT

## 2022-05-31 ENCOUNTER — OFFICE VISIT (OUTPATIENT)
Dept: FAMILY MEDICINE CLINIC | Age: 67
End: 2022-05-31
Payer: COMMERCIAL

## 2022-05-31 VITALS
HEIGHT: 69 IN | BODY MASS INDEX: 23.99 KG/M2 | TEMPERATURE: 97.8 F | RESPIRATION RATE: 16 BRPM | SYSTOLIC BLOOD PRESSURE: 134 MMHG | OXYGEN SATURATION: 97 % | WEIGHT: 162 LBS | DIASTOLIC BLOOD PRESSURE: 68 MMHG | HEART RATE: 62 BPM

## 2022-05-31 DIAGNOSIS — R73.03 PREDIABETES: ICD-10-CM

## 2022-05-31 DIAGNOSIS — I10 ESSENTIAL HYPERTENSION: Primary | ICD-10-CM

## 2022-05-31 PROCEDURE — 1124F ACP DISCUSS-NO DSCNMKR DOCD: CPT | Performed by: FAMILY MEDICINE

## 2022-05-31 PROCEDURE — 99213 OFFICE O/P EST LOW 20 MIN: CPT | Performed by: FAMILY MEDICINE

## 2022-05-31 RX ORDER — HYDROCHLOROTHIAZIDE 25 MG/1
25 TABLET ORAL DAILY
Qty: 30 TABLET | Refills: 6 | Status: SHIPPED | OUTPATIENT
Start: 2022-05-31

## 2022-05-31 RX ORDER — METOPROLOL SUCCINATE 200 MG/1
200 TABLET, EXTENDED RELEASE ORAL DAILY
Qty: 30 TABLET | Refills: 6 | Status: SHIPPED | OUTPATIENT
Start: 2022-05-31

## 2022-05-31 NOTE — PROGRESS NOTES
HPI:  Joana Reardon is a 77 y.o. female who presents today with   Chief Complaint   Patient presents with    Hypertension        Patient is here to follow-up on high blood pressure. Amlodipine was added to her regimen at her last visit. She has tolerated this medication well. She had initially had some symptoms with her RI but this resolved shortly after starting the medication. She feels well today and has no new complaints. She is due for blood test.  She does need refills on hydrochlorothiazide and metoprolol. Patient has had her blood work drawn. Her A1c has increased. Patient states that she likely may need to decrease her rice intake. She states that diabetes does not run in her family. 3 most recent PHQ Screens 5/31/2022   PHQ Not Done -   Little interest or pleasure in doing things Not at all   Feeling down, depressed, irritable, or hopeless Not at all   Total Score PHQ 2 0               PMH,  Meds, Allergies, Family History, Social history reviewed      Current Outpatient Medications   Medication Sig Dispense Refill    metoprolol succinate (TOPROL-XL) 200 mg XL tablet Take 1 Tablet by mouth daily. 30 Tablet 6    hydroCHLOROthiazide (HYDRODIURIL) 25 mg tablet Take 1 Tablet by mouth daily. 30 Tablet 6    amLODIPine (NORVASC) 5 mg tablet Take 1 Tablet by mouth daily. 30 Tablet 6    GLUCOSAM/CHOND/HYALU/CF BORATE (MOVE FREE JOINT HEALTH PO) Take  by mouth.  ethinyl estradiol-norethindron (JINTELI) 1-5 mg-mcg Tab Take 1 Tab by mouth daily.  psyllium seed-sucrose (METAMUCIL) Powd Take  by mouth.  MULTI-VITAMIN PO Take 1 Tab by mouth daily. Allergies   Allergen Reactions    Codeine Nausea and Vomiting     Violent vomiting    Ace Inhibitors Cough    Clonidine Other (comments)     Visual disturbance    Hyzaar [Losartan-Hydrochlorothiazide] Other (comments)     Palpitations.  Able to tolerate HCTZ        Lab Results   Component Value Date/Time Cholesterol, total 154 05/28/2022 07:10 AM    HDL Cholesterol 39 (L) 05/28/2022 07:10 AM    LDL, calculated 94.4 05/28/2022 07:10 AM    VLDL, calculated 20.6 05/28/2022 07:10 AM    Triglyceride 103 05/28/2022 07:10 AM    CHOL/HDL Ratio 3.9 05/28/2022 07:10 AM     Lab Results   Component Value Date/Time    Sodium 142 05/28/2022 07:10 AM    Potassium 3.9 05/28/2022 07:10 AM    Chloride 106 05/28/2022 07:10 AM    CO2 31 05/28/2022 07:10 AM    Anion gap 5 05/28/2022 07:10 AM    Glucose 118 (H) 05/28/2022 07:10 AM    BUN 12 05/28/2022 07:10 AM    Creatinine 0.99 05/28/2022 07:10 AM    BUN/Creatinine ratio 12 05/28/2022 07:10 AM    GFR est AA >60 05/28/2022 07:10 AM    GFR est non-AA 56 (L) 05/28/2022 07:10 AM    Calcium 9.9 05/28/2022 07:10 AM    Bilirubin, total 0.3 05/28/2022 07:10 AM    Alk. phosphatase 58 05/28/2022 07:10 AM    Protein, total 7.4 05/28/2022 07:10 AM    Albumin 4.2 05/28/2022 07:10 AM    Globulin 3.2 05/28/2022 07:10 AM    A-G Ratio 1.3 05/28/2022 07:10 AM    ALT (SGPT) 32 05/28/2022 07:10 AM    AST (SGOT) 22 05/28/2022 07:10 AM     Lab Results   Component Value Date/Time    Hemoglobin A1c 6.4 (H) 05/28/2022 07:10 AM               ROS as per HPI      Visit Vitals  /68   Pulse 62   Temp 97.8 °F (36.6 °C) (Temporal)   Resp 16   Ht 5' 9\" (1.753 m)   Wt 162 lb (73.5 kg)   SpO2 97%   BMI 23.92 kg/m²     Physical Exam   General appearance: alert, cooperative, no distress, appears stated age  Neck: supple, symmetrical, trachea midline, no adenopathy, thyroid: not enlarged, symmetric, no tenderness/mass/nodules, no carotid bruit and no JVD  Lungs: clear to auscultation bilaterally  Heart: regular rate and rhythm, S1, S2 normal, no murmur, click, rub or gallop    Extremities: extremities normal, atraumatic, no cyanosis or edema      Assessment/Plan:  Diagnoses and all orders for this visit:    1. Essential hypertension  -     hydroCHLOROthiazide (HYDRODIURIL) 25 mg tablet;  Take 1 Tablet by mouth daily.  -     LIPID PANEL; Future  -     METABOLIC PANEL, COMPREHENSIVE; Future    2. Prediabetes  -     HEMOGLOBIN A1C WITH EAG; Future    Other orders  -     metoprolol succinate (TOPROL-XL) 200 mg XL tablet; Take 1 Tablet by mouth daily. As above  Blood pressure is stable at subsequent check by this provider. Per patient report this blood pressure is more reflective of the blood pressure she gets at home. We will continue the same blood pressure treatment regimen at this time. Labs as ordered.;  It is anticipated that these will be drawn prior to her next visit. Refilled the medications that were needed.       Follow-up and Dispositions    · Return in about 6 months (around 11/30/2022) for well exam.            Cameron Leach MD

## 2022-05-31 NOTE — PROGRESS NOTES
1. \"Have you been to the ER, urgent care clinic since your last visit? Hospitalized since your last visit? \" No    2. \"Have you seen or consulted any other health care providers outside of the [address] [place] [address] since your last visit? \" No     3. For patients aged 39-70: Has the patient had a colonoscopy / FIT/ Cologuard? Yes - no Care Gap present      If the patient is female:    4. For patients aged 41-77: Has the patient had a mammogram within the past 2 years? No      5. For patients aged 21-65: Has the patient had a pap smear?  NA - based on age or sex

## 2022-05-31 NOTE — PATIENT INSTRUCTIONS
Learning About Low-Carbohydrate Diets  What is a low-carbohydrate diet? A low-carbohydrate (or \"low-carb\") diet limits foods and drinks that have carbohydrates. This includes grains, fruits, milk and yogurt, and starchy vegetables like potatoes, beans, and corn. It also avoids foods and drinks that have added sugar. Instead, low-carb diets include foods that are high in protein and fat. Why might you follow a low-carb diet? Low-carb diets may be used for a variety of reasons, such as for weight loss. People who have diabetes may use a low-carb diet to help manage their blood sugar levels. What should you do before you start the diet? Talk to your doctor before you try any diet. This is even more important if you have health problems like kidney disease, heart disease, or diabetes. Your doctor may suggest that you meet with a registered dietitian. A dietitian can help you make an eating plan that works for you. What foods do you eat on a low-carb diet? On a low-carb diet, you choose foods that are high in protein and fat. Examples of these are:  · Meat, poultry, and fish. · Eggs. · Nuts, such as walnuts, pecans, almonds, and peanuts. · Peanut butter and other nut butters. · Tofu. · Avocado. · [place]. · Non-starchy vegetables like broccoli, cauliflower, green beans, mushrooms, peppers, lettuce, and spinach. · Unsweetened non-dairy milks like almond milk and coconut milk. · Cheese, cottage cheese, and cream cheese. Where can you learn more? Go to http://www.gray.com/  Enter C335 in the search box to learn more about \"Learning About Low-Carbohydrate Diets. \"  Current as of: September 8, 2021               Content Version: 13.2  © 5077-3922 Healthwise, Incorporated. Care instructions adapted under license by [employer] (which disclaims liability or warranty for this information).  If you have questions about a medical condition or this instruction, always ask your healthcare professional. [place] [address] any warranty or liability for your use of this information.

## 2022-09-26 NOTE — TELEPHONE ENCOUNTER
Last Visit: 5/31/22 with MD Manpreet Barnard  Next Appointment: 11/30/22 with MD Manpreet Barnard  Previous Refill Encounter(s): 3/28/22 #30 with 6 refills    Requested Prescriptions     Pending Prescriptions Disp Refills    amLODIPine (NORVASC) 5 mg tablet 90 Tablet 1     Sig: Take 1 Tablet by mouth daily. For Jeronimo Tipton in place:   Recommendation Provided To:    Intervention Detail: New Rx: 1, reason: Patient Preference  Gap Closed?:   Intervention Accepted By:   Time Spent (min): 5

## 2022-09-30 RX ORDER — AMLODIPINE BESYLATE 5 MG/1
5 TABLET ORAL DAILY
Qty: 90 TABLET | Refills: 1 | Status: SHIPPED | OUTPATIENT
Start: 2022-09-30

## 2022-11-17 ENCOUNTER — HOSPITAL ENCOUNTER (OUTPATIENT)
Dept: LAB | Age: 67
Discharge: HOME | End: 2022-11-17
Payer: COMMERCIAL

## 2022-11-17 DIAGNOSIS — I10 ESSENTIAL HYPERTENSION: ICD-10-CM

## 2022-11-17 DIAGNOSIS — R73.03 PREDIABETES: ICD-10-CM

## 2022-11-17 LAB
ALBUMIN SERPL-MCNC: 3.9 G/DL (ref 3.4–5)
ALBUMIN/GLOB SERPL: 1.3 {RATIO} (ref 0.8–1.7)
ALP SERPL-CCNC: 55 U/L (ref 45–117)
ALT SERPL-CCNC: 22 U/L (ref 13–56)
ANION GAP SERPL CALC-SCNC: 3 MMOL/L (ref 3–18)
AST SERPL-CCNC: 12 U/L (ref 10–38)
BILIRUB SERPL-MCNC: 0.5 MG/DL (ref 0.2–1)
BUN SERPL-MCNC: 12 MG/DL (ref 7–18)
BUN/CREAT SERPL: 12 (ref 12–20)
CALCIUM SERPL-MCNC: 10.2 MG/DL (ref 8.5–10.1)
CHLORIDE SERPL-SCNC: 106 MMOL/L (ref 100–111)
CHOLEST SERPL-MCNC: 136 MG/DL
CO2 SERPL-SCNC: 31 MMOL/L (ref 21–32)
CREAT SERPL-MCNC: 0.98 MG/DL (ref 0.6–1.3)
EST. AVERAGE GLUCOSE BLD GHB EST-MCNC: 128 MG/DL
GLOBULIN SER CALC-MCNC: 3.1 G/DL (ref 2–4)
GLUCOSE SERPL-MCNC: 105 MG/DL (ref 74–99)
HBA1C MFR BLD: 6.1 % (ref 4.2–5.6)
HDLC SERPL-MCNC: 38 MG/DL (ref 40–60)
HDLC SERPL: 3.6 {RATIO} (ref 0–5)
LDLC SERPL CALC-MCNC: 83.2 MG/DL (ref 0–100)
LIPID PROFILE,FLP: ABNORMAL
POTASSIUM SERPL-SCNC: 4.2 MMOL/L (ref 3.5–5.5)
PROT SERPL-MCNC: 7 G/DL (ref 6.4–8.2)
SODIUM SERPL-SCNC: 140 MMOL/L (ref 136–145)
TRIGL SERPL-MCNC: 74 MG/DL (ref ?–150)
VLDLC SERPL CALC-MCNC: 14.8 MG/DL

## 2022-11-17 PROCEDURE — 83036 HEMOGLOBIN GLYCOSYLATED A1C: CPT

## 2022-11-17 PROCEDURE — 80053 COMPREHEN METABOLIC PANEL: CPT

## 2022-11-17 PROCEDURE — 36415 COLL VENOUS BLD VENIPUNCTURE: CPT

## 2022-11-17 PROCEDURE — 80061 LIPID PANEL: CPT

## 2022-12-06 ENCOUNTER — TELEPHONE (OUTPATIENT)
Dept: FAMILY MEDICINE CLINIC | Age: 67
End: 2022-12-06

## 2022-12-06 NOTE — TELEPHONE ENCOUNTER
----- Message from Kev Ramírez sent at 12/6/2022  1:30 PM EST -----  Subject: Message to Provider    QUESTIONS  Information for Provider? Patient meredith anted to reschedule her appointment   for 12/12 to the end of the month. There are only VV's available. She   would like to come into the office. Only day she cannot do is 12/28. She   rescheduled to 2/9 but would till like to ask to be seen the last week of   December. Please call to advise.   ---------------------------------------------------------------------------  --------------  Scott CRAIG  [phone]; OK to leave message on voicemail  ---------------------------------------------------------------------------  --------------  SCRIPT ANSWERS  Relationship to Patient?  Self

## 2023-02-09 ENCOUNTER — OFFICE VISIT (OUTPATIENT)
Dept: FAMILY MEDICINE CLINIC | Age: 68
End: 2023-02-09
Payer: COMMERCIAL

## 2023-02-09 VITALS
RESPIRATION RATE: 16 BRPM | BODY MASS INDEX: 23.91 KG/M2 | DIASTOLIC BLOOD PRESSURE: 71 MMHG | SYSTOLIC BLOOD PRESSURE: 120 MMHG | WEIGHT: 161.4 LBS | HEART RATE: 69 BPM | TEMPERATURE: 98.1 F | HEIGHT: 69 IN | OXYGEN SATURATION: 97 %

## 2023-02-09 DIAGNOSIS — I10 ESSENTIAL HYPERTENSION: Primary | ICD-10-CM

## 2023-02-09 DIAGNOSIS — R73.09 ELEVATED HEMOGLOBIN A1C: ICD-10-CM

## 2023-02-09 NOTE — PROGRESS NOTES
HPI:  Zahra Steve is a 79 y.o. female who presents today with   Chief Complaint   Patient presents with    Hypertension       9 m f/u    Blood sugar problem                    3 most recent PHQ Screens 2/9/2023   PHQ Not Done -   Little interest or pleasure in doing things Not at all   Feeling down, depressed, irritable, or hopeless Not at all   Total Score PHQ 2 0               PMH,  Meds, Allergies, Family History, Social history reviewed      Current Outpatient Medications   Medication Sig Dispense Refill    amLODIPine (NORVASC) 5 mg tablet Take 1 Tablet by mouth daily. 90 Tablet 1    metoprolol succinate (TOPROL-XL) 200 mg XL tablet Take 1 Tablet by mouth daily. 30 Tablet 6    hydroCHLOROthiazide (HYDRODIURIL) 25 mg tablet Take 1 Tablet by mouth daily. 30 Tablet 6    GLUCOSAM/CHOND/HYALU/CF BORATE (MOVE FREE JOINT HEALTH PO) Take  by mouth.  norethindrone acetate-ethinyl estradiol (FEMHRT) 1-5 mg-mcg tab Take 1 Tab by mouth daily.  psyllium seed-sucrose powd Take  by mouth.  MULTI-VITAMIN PO Take 1 Tab by mouth daily. Allergies   Allergen Reactions    Codeine Nausea and Vomiting     Violent vomiting    Ace Inhibitors Cough    Clonidine Other (comments)     Visual disturbance    Hyzaar [Losartan-Hydrochlorothiazide] Other (comments)     Palpitations. Able to tolerate HCTZ                  ROS      Visit Vitals  /71 (BP 1 Location: Left upper arm, BP Patient Position: Sitting, BP Cuff Size: Large adult)   Pulse 69   Temp 98.1 °F (36.7 °C) (Temporal)   Resp 16   Ht 5' 9\" (1.753 m)   Wt 161 lb 6.4 oz (73.2 kg)   SpO2 97%   BMI 23.83 kg/m²     Physical Exam    Assessment/Plan:    1. Essential hypertension  ***  - LIPID PANEL; Future  - METABOLIC PANEL, COMPREHENSIVE; Future    2. Elevated hemoglobin A1c  ***  - HEMOGLOBIN A1C WITH EAG;  Future       Follow-up and Dispositions    Return in about 6 months (around 8/9/2023) for well exam.            Shayy Cam, MD this visit:    1. Essential hypertension  -     LIPID PANEL; Future  -     METABOLIC PANEL, COMPREHENSIVE; Future    2. Elevated hemoglobin A1c  -     HEMOGLOBIN A1C WITH EAG; Future    As above  Patient to continue current medications and current dietary control measures to control her blood sugars and blood pressure and cholesterol. Labs have been ordered  Follow-up and Dispositions    Return in about 6 months (around 8/9/2023) for well exam.       This has been fully explained to the patient, who indicates understanding. An After Visit Summary was printed and given to the patient.          Follow-up and Dispositions    Return in about 6 months (around 8/9/2023) for well exam.            Lorrie Koch MD

## 2023-02-09 NOTE — PATIENT INSTRUCTIONS
DASH Diet: Care Instructions  Your Care Instructions     The DASH diet is an eating plan that can help lower your blood pressure. DASH stands for Dietary Approaches to Stop Hypertension. Hypertension is high blood pressure. The DASH diet focuses on eating foods that are high in calcium, potassium, and magnesium. These nutrients can lower blood pressure. The foods that are highest in these nutrients are fruits, vegetables, low-fat dairy products, nuts, seeds, and legumes. But taking calcium, potassium, and magnesium supplements instead of eating foods that are high in those nutrients does not have the same effect. The DASH diet also includes whole grains, fish, and poultry. The DASH diet is one of several lifestyle changes your doctor may recommend to lower your high blood pressure. Your doctor may also want you to decrease the amount of sodium in your diet. Lowering sodium while following the DASH diet can lower blood pressure even further than just the DASH diet alone. Follow-up care is a key part of your treatment and safety. Be sure to make and go to all appointments, and call your doctor if you are having problems. It's also a good idea to know your test results and keep a list of the medicines you take. How can you care for yourself at home? Following the DASH diet  Eat 4 to 5 servings of fruit each day. A serving is 1 medium-sized piece of fruit, ½ cup chopped or canned fruit, 1/4 cup dried fruit, or 4 ounces (½ cup) of fruit juice. Choose fruit more often than fruit juice. Eat 4 to 5 servings of vegetables each day. A serving is 1 cup of lettuce or raw leafy vegetables, ½ cup of chopped or cooked vegetables, or 4 ounces (½ cup) of vegetable juice. Choose vegetables more often than vegetable juice. Get 2 to 3 servings of low-fat and fat-free dairy each day. A serving is 8 ounces of milk, 1 cup of yogurt, or 1 ½ ounces of cheese. Eat 6 to 8 servings of grains each day.  A serving is 1 slice of bread, 1 ounce of dry cereal, or ½ cup of cooked rice, pasta, or cooked cereal. Try to choose whole-grain products as much as possible. Limit lean meat, poultry, and fish to 2 servings each day. A serving is 3 ounces, about the size of a deck of cards. Eat 4 to 5 servings of nuts, seeds, and legumes (cooked dried beans, lentils, and split peas) each week. A serving is 1/3 cup of nuts, 2 tablespoons of seeds, or ½ cup of cooked beans or peas. Limit fats and oils to 2 to 3 servings each day. A serving is 1 teaspoon of vegetable oil or 2 tablespoons of salad dressing. Limit sweets and added sugars to 5 servings or less a week. A serving is 1 tablespoon jelly or jam, ½ cup sorbet, or 1 cup of lemonade. Eat less than 2,300 milligrams (mg) of sodium a day. If you limit your sodium to 1,500 mg a day, you can lower your blood pressure even more. Be aware that all of these are the suggested number of servings for people who eat 1,800 to 2,000 calories a day. Your recommended number of servings may be different if you need more or fewer calories. Tips for success  Start small. Do not try to make dramatic changes to your diet all at once. You might feel that you are missing out on your favorite foods and then be more likely to not follow the plan. Make small changes, and stick with them. Once those changes become habit, add a few more changes. Try some of the following:  Make it a goal to eat a fruit or vegetable at every meal and at snacks. This will make it easy to get the recommended amount of fruits and vegetables each day. Try yogurt topped with fruit and nuts for a snack or healthy dessert. Add lettuce, tomato, cucumber, and onion to sandwiches. Combine a ready-made pizza crust with low-fat mozzarella cheese and lots of vegetable toppings. Try using tomatoes, squash, spinach, broccoli, carrots, cauliflower, and onions.   Have a variety of cut-up vegetables with a low-fat dip as an appetizer instead of chips and dip.  Sprinkle sunflower seeds or chopped almonds over salads. Or try adding chopped walnuts or almonds to cooked vegetables. Try some vegetarian meals using beans and peas. Add garbanzo or kidney beans to salads. Make burritos and tacos with mashed narayanan beans or black beans. Where can you learn more? Go to http://www.painter.com/  Enter H967 in the search box to learn more about \"DASH Diet: Care Instructions. \"  Current as of: January 10, 2022               Content Version: 13.4  © 8986-9804 [employer]. Care instructions adapted under license by [employer] (which disclaims liability or warranty for this information). If you have questions about a medical condition or this instruction, always ask your healthcare professional. [address] any warranty or liability for your use of this information.

## 2023-02-09 NOTE — PROGRESS NOTES
1. \"Have you been to the ER, urgent care clinic since your last visit? Hospitalized since your last visit? \" No    2. \"Have you seen or consulted any other health care providers outside of the [address] [place] [address] since your last visit? \" Yes, OBGYN      3. For patients aged 39-70: Has the patient had a colonoscopy / FIT/ Cologuard? Yes - no Care Gap present      If the patient is female:    4. For patients aged 41-77: Has the patient had a mammogram within the past 2 years? Yes - no Care Gap present      5. For patients aged 21-65: Has the patient had a pap smear?  NA - based on age or sex

## 2023-03-20 NOTE — TELEPHONE ENCOUNTER
Last appointment 2/9/2023  Next appointment 5/31/2023    Last filled 5/31/2022 #30 with 6 refills    metoprolol succinate (TOPROL-XL) 200 mg XL tablet [[id number]]     Order Details  Dose: 200 mg Route: Oral Frequency: DAILY   Dispense Quantity: 30 Tablet Refills: 6          Sig: Take 1 Tablet by mouth daily.          Start Date: 05/31/22 End Date: --   Written Date: 05/31/22 Expiration Date: --   Original Order:  metoprolol succinate (TOPROL-XL) 200 mg XL tablet [[id number]]

## 2023-03-24 RX ORDER — METOPROLOL SUCCINATE 200 MG/1
TABLET, EXTENDED RELEASE ORAL
Qty: 90 TABLET | Refills: 3 | Status: SHIPPED | OUTPATIENT
Start: 2023-03-24

## 2023-03-30 NOTE — TELEPHONE ENCOUNTER
Last Visit: 02- OV   Next Appointment: 08-  Previous Refill Encounter: 09- #90tabs with 1 refills      Requested Prescriptions     Pending Prescriptions Disp Refills    amLODIPine (NORVASC) 5 MG tablet [Pharmacy Med Name: AMLODIPINE BESYLATE 5 MG TAB] 90 tablet 1     Sig: take 1 tablet by mouth once daily

## 2023-03-31 RX ORDER — AMLODIPINE BESYLATE 5 MG/1
TABLET ORAL
Qty: 90 TABLET | Refills: 1 | Status: SHIPPED | OUTPATIENT
Start: 2023-03-31

## 2023-05-30 NOTE — TELEPHONE ENCOUNTER
Last appointment 2/9/2023  Next appointment 5/31/2023      Last written 5/31/2022 30 tablet 6 refills

## 2023-06-03 RX ORDER — HYDROCHLOROTHIAZIDE 25 MG/1
TABLET ORAL
Qty: 90 TABLET | Refills: 3 | Status: SHIPPED | OUTPATIENT
Start: 2023-06-03

## 2023-09-02 ENCOUNTER — HOSPITAL ENCOUNTER (OUTPATIENT)
Facility: HOSPITAL | Age: 68
Discharge: HOME | End: 2023-09-05
Payer: COMMERCIAL

## 2023-09-02 LAB
ALBUMIN SERPL-MCNC: 4 G/DL (ref 3.4–5)
ALBUMIN/GLOB SERPL: 1.2 (ref 0.8–1.7)
ALP SERPL-CCNC: 61 U/L (ref 45–117)
ALT SERPL-CCNC: 37 U/L (ref 13–56)
ANION GAP SERPL CALC-SCNC: 6 MMOL/L (ref 3–18)
AST SERPL-CCNC: 17 U/L (ref 10–38)
BILIRUB SERPL-MCNC: 0.5 MG/DL (ref 0.2–1)
BUN SERPL-MCNC: 12 MG/DL (ref 7–18)
BUN/CREAT SERPL: 13 (ref 12–20)
CALCIUM SERPL-MCNC: 10.2 MG/DL (ref 8.5–10.1)
CHLORIDE SERPL-SCNC: 103 MMOL/L (ref 100–111)
CHOLEST SERPL-MCNC: 162 MG/DL
CO2 SERPL-SCNC: 31 MMOL/L (ref 21–32)
CREAT SERPL-MCNC: 0.95 MG/DL (ref 0.6–1.3)
EST. AVERAGE GLUCOSE BLD GHB EST-MCNC: 137 MG/DL
GLOBULIN SER CALC-MCNC: 3.3 G/DL (ref 2–4)
GLUCOSE SERPL-MCNC: 123 MG/DL (ref 74–99)
HBA1C MFR BLD: 6.4 % (ref 4.2–5.6)
HDLC SERPL-MCNC: 41 MG/DL (ref 40–60)
HDLC SERPL: 4 (ref 0–5)
LDLC SERPL CALC-MCNC: 100 MG/DL (ref 0–100)
LIPID PANEL: NORMAL
POTASSIUM SERPL-SCNC: 4.1 MMOL/L (ref 3.5–5.5)
PROT SERPL-MCNC: 7.3 G/DL (ref 6.4–8.2)
SODIUM SERPL-SCNC: 140 MMOL/L (ref 136–145)
TRIGL SERPL-MCNC: 105 MG/DL
VLDLC SERPL CALC-MCNC: 21 MG/DL

## 2023-09-02 PROCEDURE — 80061 LIPID PANEL: CPT

## 2023-09-02 PROCEDURE — 80053 COMPREHEN METABOLIC PANEL: CPT

## 2023-09-02 PROCEDURE — 36415 COLL VENOUS BLD VENIPUNCTURE: CPT

## 2023-09-02 PROCEDURE — 83036 HEMOGLOBIN GLYCOSYLATED A1C: CPT

## 2023-09-08 SDOH — ECONOMIC STABILITY: HOUSING INSECURITY
IN THE LAST 12 MONTHS, WAS THERE A TIME WHEN YOU DID NOT HAVE A STEADY PLACE TO SLEEP OR SLEPT IN A SHELTER (INCLUDING NOW)?: NO

## 2023-09-08 SDOH — ECONOMIC STABILITY: FOOD INSECURITY: WITHIN THE PAST 12 MONTHS, YOU WORRIED THAT YOUR FOOD WOULD RUN OUT BEFORE YOU GOT MONEY TO BUY MORE.: NEVER TRUE

## 2023-09-08 SDOH — ECONOMIC STABILITY: TRANSPORTATION INSECURITY
IN THE PAST 12 MONTHS, HAS LACK OF TRANSPORTATION KEPT YOU FROM MEETINGS, WORK, OR FROM GETTING THINGS NEEDED FOR DAILY LIVING?: NO

## 2023-09-08 SDOH — ECONOMIC STABILITY: FOOD INSECURITY: WITHIN THE PAST 12 MONTHS, THE FOOD YOU BOUGHT JUST DIDN'T LAST AND YOU DIDN'T HAVE MONEY TO GET MORE.: NEVER TRUE

## 2023-09-08 SDOH — ECONOMIC STABILITY: INCOME INSECURITY: HOW HARD IS IT FOR YOU TO PAY FOR THE VERY BASICS LIKE FOOD, HOUSING, MEDICAL CARE, AND HEATING?: NOT HARD AT ALL

## 2023-09-11 ENCOUNTER — OFFICE VISIT (OUTPATIENT)
Age: 68
End: 2023-09-11
Payer: COMMERCIAL

## 2023-09-11 VITALS
WEIGHT: 164.4 LBS | TEMPERATURE: 96.8 F | RESPIRATION RATE: 16 BRPM | DIASTOLIC BLOOD PRESSURE: 74 MMHG | HEART RATE: 62 BPM | BODY MASS INDEX: 24.35 KG/M2 | SYSTOLIC BLOOD PRESSURE: 126 MMHG | HEIGHT: 69 IN | OXYGEN SATURATION: 95 %

## 2023-09-11 DIAGNOSIS — R73.09 OTHER ABNORMAL GLUCOSE: ICD-10-CM

## 2023-09-11 DIAGNOSIS — I10 ESSENTIAL (PRIMARY) HYPERTENSION: Primary | ICD-10-CM

## 2023-09-11 PROCEDURE — 99214 OFFICE O/P EST MOD 30 MIN: CPT | Performed by: FAMILY MEDICINE

## 2023-09-11 PROCEDURE — 3078F DIAST BP <80 MM HG: CPT | Performed by: FAMILY MEDICINE

## 2023-09-11 PROCEDURE — 1123F ACP DISCUSS/DSCN MKR DOCD: CPT | Performed by: FAMILY MEDICINE

## 2023-09-11 PROCEDURE — 3074F SYST BP LT 130 MM HG: CPT | Performed by: FAMILY MEDICINE

## 2023-09-11 RX ORDER — AMLODIPINE BESYLATE 5 MG/1
5 TABLET ORAL DAILY
Qty: 90 TABLET | Refills: 3 | Status: SHIPPED | OUTPATIENT
Start: 2023-09-11

## 2023-09-11 RX ORDER — METOPROLOL SUCCINATE 200 MG/1
200 TABLET, EXTENDED RELEASE ORAL DAILY
Qty: 90 TABLET | Refills: 3 | Status: SHIPPED | OUTPATIENT
Start: 2023-09-11

## 2023-09-11 NOTE — PROGRESS NOTES
1. \"Have you been to the ER, urgent care clinic since your last visit? Hospitalized since your last visit? \" No    2. \"Have you seen or consulted any other health care providers outside of the [address] [place] [address] since your last visit? \" No     3. For patients aged 43-73: Has the patient had a colonoscopy / FIT/ Cologuard? No      If the patient is female:    4. For patients aged 43-66: Has the patient had a mammogram within the past 2 years? Yes - no Care Gap present      5. For patients aged 21-65: Has the patient had a pap smear?  NA - based on age or sex

## 2023-09-11 NOTE — PROGRESS NOTES
HPI:  Mac Murphy is a 79 y.o. female who presents today with   Chief Complaint   Patient presents with    Cholesterol Problem     7 mf/u    Hypertension      HTN:  her Bps have stable;  she was started on norvasc and has tolerated     Chol:  on no meds for cholesterol. She manages her cholesterol diet and exercise. Had shingles on 8/23/2023. Shingles outbreak was localized to the back. Was seen at [facility]. Was treated with antivirals. She denies symptoms of postherpetic neuralgia at this time      She needs refills. She has had her blood work and is here to review the same. Blood work is as listed below.   Lab Results   Component Value Date    CHOL 162 09/02/2023    CHOL 136 11/17/2022    CHOL 154 05/28/2022     Lab Results   Component Value Date    TRIG 105 09/02/2023    TRIG 74 11/17/2022    TRIG 103 05/28/2022     Lab Results   Component Value Date    HDL 41 09/02/2023    HDL 38 (L) 11/17/2022    HDL 39 (L) 05/28/2022     Lab Results   Component Value Date    LDLCALC 100 09/02/2023    LDLCALC 83.2 11/17/2022    LDLCALC 94.4 05/28/2022     Lab Results   Component Value Date    LABVLDL 21 09/02/2023    LABVLDL 14.8 11/17/2022    LABVLDL 20.6 05/28/2022     Lab Results   Component Value Date    CHOLHDLRATIO 4.0 09/02/2023    CHOLHDLRATIO 3.6 11/17/2022    CHOLHDLRATIO 3.9 05/28/2022         BP Readings from Last 3 Encounters:   09/11/23 126/74   02/09/23 120/71   05/31/22 134/68       Lab Results   Component Value Date     09/02/2023    K 4.1 09/02/2023     09/02/2023    CO2 31 09/02/2023    BUN 12 09/02/2023    CREATININE 0.95 09/02/2023    GLUCOSE 123 (H) 09/02/2023    CALCIUM 10.2 (H) 09/02/2023    PROT 7.3 09/02/2023    LABALBU 4.0 09/02/2023    BILITOT 0.5 09/02/2023    ALKPHOS 61 09/02/2023    AST 17 09/02/2023    ALT 37 09/02/2023    LABGLOM >60 09/02/2023    GFRAA >60 05/28/2022    AGRATIO 1.3 11/17/2022    GLOB 3.3 09/02/2023       Hemoglobin A1C   Date Value Ref

## 2024-02-17 ENCOUNTER — HOSPITAL ENCOUNTER (OUTPATIENT)
Facility: HOSPITAL | Age: 69
Discharge: HOME | End: 2024-02-20
Payer: COMMERCIAL

## 2024-02-17 LAB
ALBUMIN SERPL-MCNC: 3.9 G/DL (ref 3.4–5)
ALBUMIN/GLOB SERPL: 1.2 (ref 0.8–1.7)
ALP SERPL-CCNC: 66 U/L (ref 45–117)
ALT SERPL-CCNC: 35 U/L (ref 13–56)
ANION GAP SERPL CALC-SCNC: 3 MMOL/L (ref 3–18)
AST SERPL-CCNC: 18 U/L (ref 10–38)
BILIRUB SERPL-MCNC: 0.4 MG/DL (ref 0.2–1)
BUN SERPL-MCNC: 14 MG/DL (ref 7–18)
BUN/CREAT SERPL: 13 (ref 12–20)
CALCIUM SERPL-MCNC: 10 MG/DL (ref 8.5–10.1)
CHLORIDE SERPL-SCNC: 109 MMOL/L (ref 100–111)
CHOLEST SERPL-MCNC: 162 MG/DL
CO2 SERPL-SCNC: 30 MMOL/L (ref 21–32)
CREAT SERPL-MCNC: 1.08 MG/DL (ref 0.6–1.3)
EST. AVERAGE GLUCOSE BLD GHB EST-MCNC: 137 MG/DL
GLOBULIN SER CALC-MCNC: 3.2 G/DL (ref 2–4)
GLUCOSE SERPL-MCNC: 121 MG/DL (ref 74–99)
HBA1C MFR BLD: 6.4 % (ref 4.2–5.6)
HDLC SERPL-MCNC: 40 MG/DL (ref 40–60)
HDLC SERPL: 4.1 (ref 0–5)
LDLC SERPL CALC-MCNC: 104.8 MG/DL (ref 0–100)
LIPID PANEL: ABNORMAL
POTASSIUM SERPL-SCNC: 3.8 MMOL/L (ref 3.5–5.5)
PROT SERPL-MCNC: 7.1 G/DL (ref 6.4–8.2)
SODIUM SERPL-SCNC: 142 MMOL/L (ref 136–145)
TRIGL SERPL-MCNC: 86 MG/DL
VLDLC SERPL CALC-MCNC: 17.2 MG/DL

## 2024-02-17 PROCEDURE — 80061 LIPID PANEL: CPT

## 2024-02-17 PROCEDURE — 80053 COMPREHEN METABOLIC PANEL: CPT

## 2024-02-17 PROCEDURE — 83036 HEMOGLOBIN GLYCOSYLATED A1C: CPT

## 2024-02-17 PROCEDURE — 36415 COLL VENOUS BLD VENIPUNCTURE: CPT

## 2024-09-01 ENCOUNTER — PATIENT MESSAGE (OUTPATIENT)
Facility: CLINIC | Age: 69
End: 2024-09-01

## 2024-09-03 NOTE — TELEPHONE ENCOUNTER
Last Visit: 05/07/2024   Next Appointment: N/A    Requested Prescriptions     Pending Prescriptions Disp Refills    metoprolol succinate (TOPROL XL) 200 MG extended release tablet [Pharmacy Med Name: METOPROLOL SUCC  MG TAB] 90 tablet 3     Sig: take 1 tablet by mouth once daily    amLODIPine (NORVASC) 5 MG tablet [Pharmacy Med Name: AMLODIPINE BESYLATE 5 MG TAB] 90 tablet 3     Sig: take 1 tablet by mouth once daily

## 2024-09-06 RX ORDER — AMLODIPINE BESYLATE 5 MG/1
5 TABLET ORAL DAILY
Qty: 90 TABLET | Refills: 3 | Status: SHIPPED | OUTPATIENT
Start: 2024-09-06

## 2024-09-06 RX ORDER — METOPROLOL SUCCINATE 200 MG/1
200 TABLET, EXTENDED RELEASE ORAL DAILY
Qty: 90 TABLET | Refills: 3 | Status: SHIPPED | OUTPATIENT
Start: 2024-09-06

## 2025-04-17 ENCOUNTER — OFFICE VISIT (OUTPATIENT)
Facility: CLINIC | Age: 70
End: 2025-04-17
Payer: MEDICARE

## 2025-04-17 VITALS
BODY MASS INDEX: 23.96 KG/M2 | HEIGHT: 69 IN | OXYGEN SATURATION: 97 % | RESPIRATION RATE: 16 BRPM | HEART RATE: 62 BPM | SYSTOLIC BLOOD PRESSURE: 148 MMHG | TEMPERATURE: 98 F | WEIGHT: 161.8 LBS | DIASTOLIC BLOOD PRESSURE: 77 MMHG

## 2025-04-17 DIAGNOSIS — I10 ESSENTIAL (PRIMARY) HYPERTENSION: Primary | ICD-10-CM

## 2025-04-17 DIAGNOSIS — R73.09 OTHER ABNORMAL GLUCOSE: ICD-10-CM

## 2025-04-17 DIAGNOSIS — E78.00 HYPERCHOLESTEROLEMIA: ICD-10-CM

## 2025-04-17 PROCEDURE — G8400 PT W/DXA NO RESULTS DOC: HCPCS | Performed by: FAMILY MEDICINE

## 2025-04-17 PROCEDURE — 99214 OFFICE O/P EST MOD 30 MIN: CPT | Performed by: FAMILY MEDICINE

## 2025-04-17 PROCEDURE — 3077F SYST BP >= 140 MM HG: CPT | Performed by: FAMILY MEDICINE

## 2025-04-17 PROCEDURE — G8427 DOCREV CUR MEDS BY ELIG CLIN: HCPCS | Performed by: FAMILY MEDICINE

## 2025-04-17 PROCEDURE — 1090F PRES/ABSN URINE INCON ASSESS: CPT | Performed by: FAMILY MEDICINE

## 2025-04-17 PROCEDURE — 3078F DIAST BP <80 MM HG: CPT | Performed by: FAMILY MEDICINE

## 2025-04-17 PROCEDURE — G8420 CALC BMI NORM PARAMETERS: HCPCS | Performed by: FAMILY MEDICINE

## 2025-04-17 PROCEDURE — 1126F AMNT PAIN NOTED NONE PRSNT: CPT | Performed by: FAMILY MEDICINE

## 2025-04-17 PROCEDURE — 1159F MED LIST DOCD IN RCRD: CPT | Performed by: FAMILY MEDICINE

## 2025-04-17 PROCEDURE — 1160F RVW MEDS BY RX/DR IN RCRD: CPT | Performed by: FAMILY MEDICINE

## 2025-04-17 PROCEDURE — 1123F ACP DISCUSS/DSCN MKR DOCD: CPT | Performed by: FAMILY MEDICINE

## 2025-04-17 PROCEDURE — 3017F COLORECTAL CA SCREEN DOC REV: CPT | Performed by: FAMILY MEDICINE

## 2025-04-17 PROCEDURE — 1036F TOBACCO NON-USER: CPT | Performed by: FAMILY MEDICINE

## 2025-04-17 RX ORDER — HYDROCHLOROTHIAZIDE 25 MG/1
25 TABLET ORAL DAILY
Qty: 90 TABLET | Refills: 3 | Status: SHIPPED | OUTPATIENT
Start: 2025-04-17

## 2025-04-17 SDOH — ECONOMIC STABILITY: FOOD INSECURITY: WITHIN THE PAST 12 MONTHS, THE FOOD YOU BOUGHT JUST DIDN'T LAST AND YOU DIDN'T HAVE MONEY TO GET MORE.: NEVER TRUE

## 2025-04-17 SDOH — ECONOMIC STABILITY: FOOD INSECURITY: WITHIN THE PAST 12 MONTHS, YOU WORRIED THAT YOUR FOOD WOULD RUN OUT BEFORE YOU GOT MONEY TO BUY MORE.: NEVER TRUE

## 2025-04-17 ASSESSMENT — PATIENT HEALTH QUESTIONNAIRE - PHQ9
SUM OF ALL RESPONSES TO PHQ QUESTIONS 1-9: 0
SUM OF ALL RESPONSES TO PHQ QUESTIONS 1-9: 0
1. LITTLE INTEREST OR PLEASURE IN DOING THINGS: NOT AT ALL
SUM OF ALL RESPONSES TO PHQ QUESTIONS 1-9: 0
SUM OF ALL RESPONSES TO PHQ QUESTIONS 1-9: 0
2. FEELING DOWN, DEPRESSED OR HOPELESS: NOT AT ALL

## 2025-04-17 NOTE — PROGRESS NOTES
\"Have you been to the ER, urgent care clinic since your last visit?  Hospitalized since your last visit?\"    YES - When: approximately 3 months ago.  Where and Why: [facility].    “Have you seen or consulted any other health care providers outside our system since your last visit?”    NO

## 2025-04-17 NOTE — PROGRESS NOTES
HPI:  Tameka Quintero is a 69 y.o. female who presents today with   Chief Complaint   Patient presents with    Hypertension        History of Present Illness  The patient presents for evaluation of hypertension and hot flashes.    Blood pressure has been monitored at home, with the highest recorded reading being 139 systolic. During a recent visit to the pharmacy, her blood pressure was measured at 135. Upon arrival at the clinic today, her initial blood pressure reading was 157. She is currently on a regimen of amlodipine 5 mg and metoprolol, both administered once daily.    Birth control pills are used as a treatment for hot flashes, which have been severe in the past. No menstrual periods have been experienced while on this medication.    Bone density tests have not been undergone to date. A supplement for joint health is currently taken.      Patient has also had hyperglycemia and hypercholesterolemia.  She has retired.     Lab Results   Component Value Date    CHOL 162 02/17/2024    TRIG 86 02/17/2024    HDL 40 02/17/2024    .8 (H) 02/17/2024    VLDL 17.2 02/17/2024    CHOLHDLRATIO 4.1 02/17/2024     Lab Results   Component Value Date     02/17/2024    K 3.8 02/17/2024     02/17/2024    CO2 30 02/17/2024    BUN 14 02/17/2024    CREATININE 1.08 02/17/2024    GLUCOSE 121 (H) 02/17/2024    CALCIUM 10.0 02/17/2024    BILITOT 0.4 02/17/2024    ALKPHOS 66 02/17/2024    AST 18 02/17/2024    ALT 35 02/17/2024    LABGLOM 56 (L) 02/17/2024    GFRAA >60 05/28/2022    AGRATIO 1.3 11/17/2022    GLOB 3.2 02/17/2024         Wt Readings from Last 3 Encounters:   04/17/25 73.4 kg (161 lb 12.8 oz)   05/07/24 74.6 kg (164 lb 6.4 oz)   09/11/23 74.6 kg (164 lb 6.4 oz)                No data to display                  PMH,  Meds, Allergies, Family History, Social history reviewed      Current Outpatient Medications   Medication Sig Dispense Refill    hydroCHLOROthiazide (HYDRODIURIL) 25 MG tablet Take

## 2025-04-26 NOTE — PATIENT INSTRUCTIONS
dip.  Sprinkle sunflower seeds or chopped almonds over salads. Or try adding chopped walnuts or almonds to cooked vegetables.  Try some vegetarian meals using beans and peas. Add garbanzo or kidney beans to salads. Make burritos and tacos with mashed bland beans or black beans.  Where can you learn more?  Go to https://www.Numerate.net/patientEd and enter H967 to learn more about \"DASH Diet: Care Instructions.\"  Current as of: September 7, 2022               Content Version: 13.5  © 1093-8363 [employer].   Care instructions adapted under license by [employer]. If you have questions about a medical condition or this instruction, always ask your healthcare professional. [employer] disclaims any warranty or liability for your use of this information.

## 2025-07-23 ENCOUNTER — OFFICE VISIT (OUTPATIENT)
Age: 70
End: 2025-07-23

## 2025-07-23 ENCOUNTER — HOSPITAL ENCOUNTER (OUTPATIENT)
Facility: HOSPITAL | Age: 70
Discharge: HOME | End: 2025-07-26

## 2025-07-23 VITALS — BODY MASS INDEX: 23.63 KG/M2 | WEIGHT: 160 LBS

## 2025-07-23 DIAGNOSIS — M99.01 CERVICAL SOMATIC DYSFUNCTION: ICD-10-CM

## 2025-07-23 DIAGNOSIS — M99.04 SACRAL REGION SOMATIC DYSFUNCTION: ICD-10-CM

## 2025-07-23 DIAGNOSIS — M77.01 MEDIAL EPICONDYLITIS, RIGHT: Primary | ICD-10-CM

## 2025-07-23 DIAGNOSIS — M99.03 SOMATIC DYSFUNCTION OF LUMBAR REGION: ICD-10-CM

## 2025-07-23 DIAGNOSIS — M77.01 MEDIAL EPICONDYLITIS, RIGHT: ICD-10-CM

## 2025-07-23 DIAGNOSIS — M99.05 PELVIC REGION SOMATIC DYSFUNCTION: ICD-10-CM

## 2025-07-23 DIAGNOSIS — M99.06 LOWER LIMB REGION SOMATIC DYSFUNCTION: ICD-10-CM

## 2025-07-23 DIAGNOSIS — M25.812 SHOULDER IMPINGEMENT, LEFT: ICD-10-CM

## 2025-07-23 DIAGNOSIS — M99.02 THORACIC REGION SOMATIC DYSFUNCTION: ICD-10-CM

## 2025-07-23 DIAGNOSIS — M99.09 SOMATIC DYSFUNCTION OF ABDOMINAL REGION: ICD-10-CM

## 2025-07-23 DIAGNOSIS — M99.08 RIB CAGE REGION SOMATIC DYSFUNCTION: ICD-10-CM

## 2025-07-23 DIAGNOSIS — M99.07 UPPER EXTREMITY SOMATIC DYSFUNCTION: ICD-10-CM

## 2025-07-23 RX ORDER — IBUPROFEN 800 MG/1
800 TABLET, FILM COATED ORAL
Qty: 90 TABLET | Refills: 1 | Status: SHIPPED | OUTPATIENT
Start: 2025-07-23

## 2025-07-23 NOTE — PROGRESS NOTES
HISTORY OF PRESENT ILLNESS    Tameka Quintero 1955 is a 69 y.o. year old female comes in today as new patient for: right elbow, left shoulder    Patients symptoms have been present for 2 weeks elbow, left shoulder chronic worse couple months.  Pain level 0 - No pain/10 medial right elbow. It has worsened with use/lift.  Patient has tried:  Ibuprofen 800mg with benefit particularly for right elbow.  It is described as pain as above without injury. Last medial epicondylitis injection 2020.    IMAGING: XR right elbow, left shoulder pending    Past Surgical History:   Procedure Laterality Date    GYN      BTL 1979    GYN      Fibroid Emobliation 2001     Social History     Socioeconomic History    Marital status:    Tobacco Use    Smoking status: Never    Smokeless tobacco: Never   Vaping Use    Vaping status: Never Used   Substance and Sexual Activity    Alcohol use: No    Drug use: No     Social Drivers of Health     Financial Resource Strain: Low Risk  (9/8/2023)    Overall Financial Resource Strain (CARDIA)     Difficulty of Paying Living Expenses: Not hard at all   Food Insecurity: No Food Insecurity (4/17/2025)    Hunger Vital Sign     Worried About Running Out of Food in the Last Year: Never true     Ran Out of Food in the Last Year: Never true   Transportation Needs: No Transportation Needs (4/17/2025)    PRAPARE - Transportation     Lack of Transportation (Medical): No     Lack of Transportation (Non-Medical): No   Housing Stability: Low Risk  (4/17/2025)    Housing Stability Vital Sign     Unable to Pay for Housing in the Last Year: No     Number of Times Moved in the Last Year: 0     Homeless in the Last Year: No      Current Outpatient Medications   Medication Sig Dispense Refill    hydroCHLOROthiazide (HYDRODIURIL) 25 MG tablet Take 1 tablet by mouth daily 90 tablet 3    metoprolol succinate (TOPROL XL) 200 MG extended release tablet take 1 tablet by mouth once daily 90 tablet 3

## 2025-07-23 NOTE — PATIENT INSTRUCTIONS
Wrist flexor stretch    Extend your arm in front of you with your palm up.  Bend your wrist, pointing your hand toward the floor.  With your other hand, gently bend your wrist farther until you feel a mild to moderate stretch in your forearm.  Hold for at least 15 to 30 seconds. Repeat 2 to 4 times.  Wrist extensor stretch    Repeat steps 1 through 4 of the stretch above, but begin with your extended hand palm down.     Either scan this QR code on your smartphone:        Or search YouTube for my channel:    Dr. TERRI Boyd    Rotator cuff